# Patient Record
Sex: MALE | Race: WHITE | Employment: OTHER | URBAN - METROPOLITAN AREA
[De-identification: names, ages, dates, MRNs, and addresses within clinical notes are randomized per-mention and may not be internally consistent; named-entity substitution may affect disease eponyms.]

---

## 2017-06-20 ENCOUNTER — HOSPITAL ENCOUNTER (INPATIENT)
Age: 77
LOS: 1 days | Discharge: HOME | DRG: 310 | End: 2017-06-22
Attending: EMERGENCY MEDICINE | Admitting: INTERNAL MEDICINE
Payer: MEDICARE

## 2017-06-20 ENCOUNTER — APPOINTMENT (OUTPATIENT)
Dept: GENERAL RADIOLOGY | Age: 77
DRG: 310 | End: 2017-06-20
Attending: EMERGENCY MEDICINE
Payer: MEDICARE

## 2017-06-20 DIAGNOSIS — I48.91 NEW ONSET ATRIAL FIBRILLATION (HCC): Primary | ICD-10-CM

## 2017-06-20 DIAGNOSIS — I48.91 ATRIAL FIBRILLATION WITH RVR (HCC): ICD-10-CM

## 2017-06-20 PROBLEM — E03.9 ACQUIRED HYPOTHYROIDISM: Status: ACTIVE | Noted: 2017-06-20

## 2017-06-20 PROBLEM — C61 PROSTATE CANCER (HCC): Status: ACTIVE | Noted: 2017-06-20

## 2017-06-20 PROBLEM — I10 HTN (HYPERTENSION): Status: ACTIVE | Noted: 2017-06-20

## 2017-06-20 PROBLEM — E78.5 DYSLIPIDEMIA: Status: ACTIVE | Noted: 2017-06-20

## 2017-06-20 LAB
ALBUMIN SERPL BCP-MCNC: 3.3 G/DL (ref 3.2–4.6)
ALBUMIN/GLOB SERPL: 1.1 {RATIO} (ref 1.2–3.5)
ALP SERPL-CCNC: 81 U/L (ref 50–136)
ALT SERPL-CCNC: 24 U/L (ref 12–65)
ANION GAP BLD CALC-SCNC: 8 MMOL/L (ref 7–16)
AST SERPL W P-5'-P-CCNC: 27 U/L (ref 15–37)
ATRIAL RATE: 192 BPM
BASOPHILS # BLD AUTO: 0 K/UL (ref 0–0.2)
BASOPHILS # BLD: 1 % (ref 0–2)
BILIRUB SERPL-MCNC: 0.7 MG/DL (ref 0.2–1.1)
BUN SERPL-MCNC: 28 MG/DL (ref 8–23)
CALCIUM SERPL-MCNC: 8.2 MG/DL (ref 8.3–10.4)
CALCULATED R AXIS, ECG10: 57 DEGREES
CALCULATED T AXIS, ECG11: -62 DEGREES
CHLORIDE SERPL-SCNC: 112 MMOL/L (ref 98–107)
CO2 SERPL-SCNC: 25 MMOL/L (ref 21–32)
CREAT SERPL-MCNC: 2.04 MG/DL (ref 0.8–1.5)
DIAGNOSIS, 93000: NORMAL
DIFFERENTIAL METHOD BLD: NORMAL
EOSINOPHIL # BLD: 0.2 K/UL (ref 0–0.8)
EOSINOPHIL NFR BLD: 3 % (ref 0.5–7.8)
ERYTHROCYTE [DISTWIDTH] IN BLOOD BY AUTOMATED COUNT: 13.5 % (ref 11.9–14.6)
GLOBULIN SER CALC-MCNC: 3.1 G/DL (ref 2.3–3.5)
GLUCOSE SERPL-MCNC: 174 MG/DL (ref 65–100)
HCT VFR BLD AUTO: 42 % (ref 41.1–50.3)
HGB BLD-MCNC: 14.5 G/DL (ref 13.6–17.2)
IMM GRANULOCYTES # BLD: 0 K/UL (ref 0–0.5)
IMM GRANULOCYTES NFR BLD AUTO: 0.2 % (ref 0–5)
LYMPHOCYTES # BLD AUTO: 18 % (ref 13–44)
LYMPHOCYTES # BLD: 1.1 K/UL (ref 0.5–4.6)
MCH RBC QN AUTO: 29.8 PG (ref 26.1–32.9)
MCHC RBC AUTO-ENTMCNC: 34.5 G/DL (ref 31.4–35)
MCV RBC AUTO: 86.4 FL (ref 79.6–97.8)
MONOCYTES # BLD: 0.8 K/UL (ref 0.1–1.3)
MONOCYTES NFR BLD AUTO: 12 % (ref 4–12)
NEUTS SEG # BLD: 4.1 K/UL (ref 1.7–8.2)
NEUTS SEG NFR BLD AUTO: 66 % (ref 43–78)
PLATELET # BLD AUTO: 150 K/UL (ref 150–450)
PMV BLD AUTO: 10.8 FL (ref 10.8–14.1)
POTASSIUM SERPL-SCNC: 4.1 MMOL/L (ref 3.5–5.1)
PROT SERPL-MCNC: 6.4 G/DL (ref 6.3–8.2)
Q-T INTERVAL, ECG07: 304 MS
QRS DURATION, ECG06: 102 MS
QTC CALCULATION (BEZET), ECG08: 414 MS
RBC # BLD AUTO: 4.86 M/UL (ref 4.23–5.67)
SODIUM SERPL-SCNC: 145 MMOL/L (ref 136–145)
TROPONIN I BLD-MCNC: 0.01 NG/ML (ref 0–0.08)
TROPONIN I SERPL-MCNC: <0.02 NG/ML (ref 0.02–0.05)
TSH SERPL DL<=0.005 MIU/L-ACNC: 1.68 UIU/ML (ref 0.36–3.74)
VENTRICULAR RATE, ECG03: 112 BPM
WBC # BLD AUTO: 6.2 K/UL (ref 4.3–11.1)

## 2017-06-20 PROCEDURE — 74011000258 HC RX REV CODE- 258: Performed by: NURSE PRACTITIONER

## 2017-06-20 PROCEDURE — 74011000250 HC RX REV CODE- 250: Performed by: NURSE PRACTITIONER

## 2017-06-20 PROCEDURE — 85025 COMPLETE CBC W/AUTO DIFF WBC: CPT | Performed by: EMERGENCY MEDICINE

## 2017-06-20 PROCEDURE — 84443 ASSAY THYROID STIM HORMONE: CPT | Performed by: EMERGENCY MEDICINE

## 2017-06-20 PROCEDURE — 96374 THER/PROPH/DIAG INJ IV PUSH: CPT | Performed by: EMERGENCY MEDICINE

## 2017-06-20 PROCEDURE — 99285 EMERGENCY DEPT VISIT HI MDM: CPT | Performed by: EMERGENCY MEDICINE

## 2017-06-20 PROCEDURE — 74011000250 HC RX REV CODE- 250: Performed by: EMERGENCY MEDICINE

## 2017-06-20 PROCEDURE — 74011250636 HC RX REV CODE- 250/636: Performed by: NURSE PRACTITIONER

## 2017-06-20 PROCEDURE — 99218 HC RM OBSERVATION: CPT

## 2017-06-20 PROCEDURE — 36415 COLL VENOUS BLD VENIPUNCTURE: CPT | Performed by: INTERNAL MEDICINE

## 2017-06-20 PROCEDURE — 80053 COMPREHEN METABOLIC PANEL: CPT | Performed by: EMERGENCY MEDICINE

## 2017-06-20 PROCEDURE — 84484 ASSAY OF TROPONIN QUANT: CPT

## 2017-06-20 PROCEDURE — 74011250637 HC RX REV CODE- 250/637: Performed by: NURSE PRACTITIONER

## 2017-06-20 PROCEDURE — 93005 ELECTROCARDIOGRAM TRACING: CPT | Performed by: EMERGENCY MEDICINE

## 2017-06-20 PROCEDURE — 71010 XR CHEST SNGL V: CPT

## 2017-06-20 PROCEDURE — 96375 TX/PRO/DX INJ NEW DRUG ADDON: CPT | Performed by: EMERGENCY MEDICINE

## 2017-06-20 RX ORDER — HEPARIN SODIUM 5000 [USP'U]/100ML
12-25 INJECTION, SOLUTION INTRAVENOUS
Status: DISCONTINUED | OUTPATIENT
Start: 2017-06-20 | End: 2017-06-21

## 2017-06-20 RX ORDER — HEPARIN SODIUM 5000 [USP'U]/ML
4000 INJECTION, SOLUTION INTRAVENOUS; SUBCUTANEOUS ONCE
Status: COMPLETED | OUTPATIENT
Start: 2017-06-20 | End: 2017-06-20

## 2017-06-20 RX ORDER — SODIUM CHLORIDE 0.9 % (FLUSH) 0.9 %
5-10 SYRINGE (ML) INJECTION EVERY 8 HOURS
Status: DISCONTINUED | OUTPATIENT
Start: 2017-06-20 | End: 2017-06-22 | Stop reason: HOSPADM

## 2017-06-20 RX ORDER — TERAZOSIN 5 MG/1
10 CAPSULE ORAL
Status: DISCONTINUED | OUTPATIENT
Start: 2017-06-20 | End: 2017-06-22 | Stop reason: HOSPADM

## 2017-06-20 RX ORDER — SODIUM CHLORIDE 0.9 % (FLUSH) 0.9 %
5-10 SYRINGE (ML) INJECTION AS NEEDED
Status: DISCONTINUED | OUTPATIENT
Start: 2017-06-20 | End: 2017-06-22 | Stop reason: HOSPADM

## 2017-06-20 RX ORDER — DILTIAZEM HYDROCHLORIDE 5 MG/ML
25 INJECTION INTRAVENOUS
Status: COMPLETED | OUTPATIENT
Start: 2017-06-20 | End: 2017-06-20

## 2017-06-20 RX ORDER — SIMVASTATIN 20 MG/1
10 TABLET, FILM COATED ORAL
Status: DISCONTINUED | OUTPATIENT
Start: 2017-06-20 | End: 2017-06-21

## 2017-06-20 RX ORDER — MORPHINE SULFATE 2 MG/ML
2 INJECTION, SOLUTION INTRAMUSCULAR; INTRAVENOUS
Status: DISCONTINUED | OUTPATIENT
Start: 2017-06-20 | End: 2017-06-22 | Stop reason: HOSPADM

## 2017-06-20 RX ORDER — SIMVASTATIN 20 MG/1
20 TABLET, FILM COATED ORAL
COMMUNITY
End: 2017-06-22

## 2017-06-20 RX ORDER — TERAZOSIN 10 MG/1
10 CAPSULE ORAL
COMMUNITY
End: 2021-11-16

## 2017-06-20 RX ORDER — DILTIAZEM HYDROCHLORIDE 5 MG/ML
10 INJECTION INTRAVENOUS ONCE
Status: COMPLETED | OUTPATIENT
Start: 2017-06-20 | End: 2017-06-20

## 2017-06-20 RX ADMIN — TERAZOSIN HYDROCHLORIDE 10 MG: 5 CAPSULE ORAL at 21:31

## 2017-06-20 RX ADMIN — HEPARIN SODIUM 4000 UNITS: 5000 INJECTION, SOLUTION INTRAVENOUS; SUBCUTANEOUS at 19:29

## 2017-06-20 RX ADMIN — Medication 5 ML: at 21:32

## 2017-06-20 RX ADMIN — HEPARIN SODIUM AND DEXTROSE 12 UNITS/KG/HR: 5000; 5 INJECTION INTRAVENOUS at 19:30

## 2017-06-20 RX ADMIN — DILTIAZEM HYDROCHLORIDE 25 MG: 5 INJECTION INTRAVENOUS at 17:58

## 2017-06-20 RX ADMIN — SODIUM CHLORIDE 10 MG/HR: 900 INJECTION, SOLUTION INTRAVENOUS at 19:28

## 2017-06-20 RX ADMIN — DILTIAZEM HYDROCHLORIDE 10 MG: 5 INJECTION INTRAVENOUS at 19:28

## 2017-06-20 RX ADMIN — SIMVASTATIN 10 MG: 20 TABLET, FILM COATED ORAL at 21:31

## 2017-06-20 NOTE — ED NOTES
TRANSFER - OUT REPORT:    Verbal report given to NGUYỄN phelps on LAURA  being transferred to 328(unit) for routine progression of care       Report consisted of patients Situation, Background, Assessment and   Recommendations(SBAR). Information from the following report(s) SBAR, ED Summary, [facility] and Recent Results was reviewed with the receiving nurse. Lines:   Peripheral IV 06/20/17 Right Wrist (Active)   Site Assessment Clean, dry, & intact 6/20/2017  4:31 PM   Phlebitis Assessment 0 6/20/2017  4:31 PM   Infiltration Assessment 0 6/20/2017  4:31 PM   Dressing Status Clean, dry, & intact 6/20/2017  4:31 PM   Dressing Type Transparent 6/20/2017  4:31 PM   Hub Color/Line Status Pink;Flushed;Patent 6/20/2017  4:31 PM        Opportunity for questions and clarification was provided.       Patient transported with:   Monitor  Registered Nurse

## 2017-06-20 NOTE — ED PROVIDER NOTES
HPI Comments: Patient goes to the [address] [facility]. Was there to get his ears cleaned out. They checked his blood pressure and noted a fast heart rate with atrial fibrillation. In route with EMS 20 mg of Cardizem was given. Rate dropped below 100. It is slowly climbing here. Patient denies symptoms of chest pain shortness of breath weakness or lightheadedness. Patient is a 65 y.o. male presenting with palpitations. The history is provided by the patient. No  was used. Palpitations    This is a new problem. The current episode started 1 to 2 hours ago. The problem has not changed since onset. The problem occurs constantly. The problem is associated with nothing. Associated symptoms include irregular heartbeat. Pertinent negatives include no diaphoresis, no fever, no malaise/fatigue, no numbness, no chest pain, no chest pressure, no exertional chest pressure, no near-syncope, no orthopnea, no abdominal pain, no nausea, no vomiting, no headaches, no back pain, no leg pain, no lower extremity edema, no dizziness, no weakness, no cough and no shortness of breath. Risk factors include male gender. He has tried nothing for the symptoms. His past medical history does not include atrial fibrillation. Past Medical History:   Diagnosis Date    CAD (coronary artery disease)     high cholesterol    Cancer (HCC)     prostate    Endocrine disease     thyroid       Past Surgical History:   Procedure Laterality Date    HX PROSTATECTOMY           No family history on file. Social History     Social History    Marital status:      Spouse name: N/A    Number of children: N/A    Years of education: N/A     Occupational History    Not on file.      Social History Main Topics    Smoking status: Former Smoker    Smokeless tobacco: Not on file    Alcohol use No    Drug use: Not on file    Sexual activity: Not on file     Other Topics Concern    Not on file     Social History Narrative    No narrative on file         ALLERGIES: Review of patient's allergies indicates no known allergies. Review of Systems   Constitutional: Negative for chills, diaphoresis, fever and malaise/fatigue. HENT: Negative for rhinorrhea and sore throat. Eyes: Negative for pain and redness. Respiratory: Negative for cough, chest tightness, shortness of breath and wheezing. Cardiovascular: Positive for palpitations. Negative for chest pain, orthopnea, leg swelling and near-syncope. Gastrointestinal: Negative for abdominal pain, diarrhea, nausea and vomiting. Genitourinary: Negative for dysuria and hematuria. Musculoskeletal: Negative for back pain, gait problem, neck pain and neck stiffness. Skin: Negative for color change and rash. Neurological: Negative for dizziness, weakness, numbness and headaches. Vitals:    06/20/17 1600 06/20/17 1620 06/20/17 1638 06/20/17 1652   BP: (!) 139/91 162/78 162/78    Pulse: (!) 132 (!) 126 (!) 136 (!) 126   Resp: 20      Temp: 97.9 °F (36.6 °C)      SpO2: 96% 95% 96% 94%   Weight: 81.6 kg (180 lb)      Height: 5' 6.5\" (1.689 m)               Physical Exam   Constitutional: He is oriented to person, place, and time. He appears well-developed and well-nourished. HENT:   Head: Normocephalic and atraumatic. Neck: Normal range of motion. Neck supple. Cardiovascular: An irregularly irregular rhythm present. Tachycardia present. No murmur heard. Pulmonary/Chest: Effort normal and breath sounds normal. He has no wheezes. Abdominal: Soft. Bowel sounds are normal. There is no tenderness. Musculoskeletal: Normal range of motion. He exhibits no edema. Neurological: He is alert and oriented to person, place, and time. Skin: Skin is warm and dry. Nursing note and vitals reviewed. MDM  Number of Diagnoses or Management Options  Diagnosis management comments: Discussed with cardiology and will come and see.         Amount and/or Complexity of Data Reviewed  Clinical lab tests: ordered and reviewed  Tests in the radiology section of CPT®: reviewed and ordered  Tests in the medicine section of CPT®: ordered and reviewed    Patient Progress  Patient progress: stable    ED Course       Procedures      EKG: nonspecific ST and T waves changes, atrial fibrillation, rate 112. XR CHEST SNGL V (Final result) Result time: 06/20/17 16:40:16     Final result by Isabella Eduardo MD (06/20/17 16:40:16)     Impression:     IMPRESSION: No acute abnormality.     Narrative:     Portable chest x-ray    Clinical indications: Tachycardia    FINDINGS: Single AP view the chest submitted without comparison show the lungs  to be expanded and clear. No pleural effusion or pneumothorax. Old rib fractures  noted on the right. The cardiac silhouette and mediastinum are unremarkable.                Results Include:    Recent Results (from the past 24 hour(s))   EKG, 12 LEAD, INITIAL    Collection Time: 06/20/17  4:01 PM   Result Value Ref Range    Ventricular Rate 112 BPM    Atrial Rate 192 BPM    QRS Duration 102 ms    Q-T Interval 304 ms    QTC Calculation (Bezet) 414 ms    Calculated R Axis 57 degrees    Calculated T Axis -62 degrees    Diagnosis       !! AGE AND GENDER SPECIFIC ECG ANALYSIS !!   Atrial fibrillation with rapid ventricular response  Nonspecific ST and T wave abnormality , probably digitalis effect  Abnormal ECG  When compared with ECG of 05-JAN-2016 13:45,  Atrial fibrillation has replaced Sinus rhythm  Criteria for Anterior infarct are no longer Present  ST now depressed in Inferior leads  Confirmed by Evelina Coreas MD (), ALBERTO BRIDGES ([id number]) on 6/20/2017 4:56:18 PM     CBC WITH AUTOMATED DIFF    Collection Time: 06/20/17  4:15 PM   Result Value Ref Range    WBC 6.2 4.3 - 11.1 K/uL    RBC 4.86 4.23 - 5.67 M/uL    HGB 14.5 13.6 - 17.2 g/dL    HCT 42.0 41.1 - 50.3 %    MCV 86.4 79.6 - 97.8 FL    MCH 29.8 26.1 - 32.9 PG    MCHC 34.5 31.4 - 35.0 g/dL    RDW 13.5 11.9 - 14.6 % PLATELET 465 169 - 810 K/uL    MPV 10.8 10.8 - 14.1 FL    DF AUTOMATED      NEUTROPHILS 66 43 - 78 %    LYMPHOCYTES 18 13 - 44 %    MONOCYTES 12 4.0 - 12.0 %    EOSINOPHILS 3 0.5 - 7.8 %    BASOPHILS 1 0.0 - 2.0 %    IMMATURE GRANULOCYTES 0.2 0.0 - 5.0 %    ABS. NEUTROPHILS 4.1 1.7 - 8.2 K/UL    ABS. LYMPHOCYTES 1.1 0.5 - 4.6 K/UL    ABS. MONOCYTES 0.8 0.1 - 1.3 K/UL    ABS. EOSINOPHILS 0.2 0.0 - 0.8 K/UL    ABS. BASOPHILS 0.0 0.0 - 0.2 K/UL    ABS. IMM. GRANS. 0.0 0.0 - 0.5 K/UL   METABOLIC PANEL, COMPREHENSIVE    Collection Time: 06/20/17  4:15 PM   Result Value Ref Range    Sodium 145 136 - 145 mmol/L    Potassium 4.1 3.5 - 5.1 mmol/L    Chloride 112 (H) 98 - 107 mmol/L    CO2 25 21 - 32 mmol/L    Anion gap 8 7 - 16 mmol/L    Glucose 174 (H) 65 - 100 mg/dL    BUN 28 (H) 8 - 23 MG/DL    Creatinine 2.04 (H) 0.8 - 1.5 MG/DL    GFR est AA 41 (L) >60 ml/min/1.73m2    GFR est non-AA 34 (L) >60 ml/min/1.73m2    Calcium 8.2 (L) 8.3 - 10.4 MG/DL    Bilirubin, total 0.7 0.2 - 1.1 MG/DL    ALT (SGPT) 24 12 - 65 U/L    AST (SGOT) 27 15 - 37 U/L    Alk.  phosphatase 81 50 - 136 U/L    Protein, total 6.4 6.3 - 8.2 g/dL    Albumin 3.3 3.2 - 4.6 g/dL    Globulin 3.1 2.3 - 3.5 g/dL    A-G Ratio 1.1 (L) 1.2 - 3.5     POC TROPONIN-I    Collection Time: 06/20/17  4:17 PM   Result Value Ref Range    Troponin-I (POC) 0.01 0.0 - 0.08 ng/ml

## 2017-06-20 NOTE — ED TRIAGE NOTES
Patient went to [place] to have ears cleaned, but found to have heart rate in the 140s there. No history of afib. EMS called. Given 20 mg cardizem IVP which brought heart rate down to low 100s. Denies symptoms. Denies shortness of breath despite seeming short of breath to paramedics.

## 2017-06-20 NOTE — IP AVS SNAPSHOT
Current Discharge Medication List  
  
START taking these medications Dose & Instructions Dispensing Information Comments Morning Noon Evening Bedtime  
 amiodarone 200 mg tablet Commonly known as:  CORDARONE Your next dose is:  6/23 Dose:  200 mg Take 1 Tab by mouth two (2) times a day. Quantity:  60 Tab Refills:  1  
     
  
   
   
  
   
  
 finasteride 5 mg tablet Commonly known as:  PROSCAR Your next dose is:  6/23 Dose:  5 mg Take 1 Tab by mouth daily. Quantity:  30 Tab Refills:  6  
     
   
   
   
  
 metoprolol tartrate 50 mg tablet Commonly known as:  LOPRESSOR Your next dose is:  6/23 Dose:  50 mg Take 1 Tab by mouth two (2) times a day. Quantity:  60 Tab Refills:  6 CONTINUE these medications which have NOT CHANGED Dose & Instructions Dispensing Information Comments Morning Noon Evening Bedtime HYDROcodone-acetaminophen 5-325 mg per tablet Commonly known as:  Juan Daniel Grumbles Your next dose is:  As needed Dose:  1 Tab Take 1 Tab by mouth every four (4) hours as needed for Pain. Max Daily Amount: 6 Tabs. Quantity:  20 Tab Refills:  0  
     
   
   
   
  
 terazosin 10 mg capsule Commonly known as:  HYTRIN Your next dose is:  6/22 Dose:  10 mg Take 10 mg by mouth nightly. Refills:  0 STOP taking these medications   
 simvastatin 20 mg tablet Commonly known as:  ZOCOR Where to Get Your Medications Information on where to get these meds will be given to you by the nurse or doctor. ! Ask your nurse or doctor about these medications  
  amiodarone 200 mg tablet  
 finasteride 5 mg tablet  
 metoprolol tartrate 50 mg tablet

## 2017-06-20 NOTE — H&P
[address] Cardiology H&P    Admitting Cardiologist:Dr. Mal Dumas    Primary Cardiologist: none    Primary Care Physician:[facility]    Subjective:     Arcadio Chatman is a 65 y.o. male with darlene of HTN, CKD, hypothryoidism, no prior CAD, no prior CHF, denies bleeding or clotting disorders, who was at [address] [facility] primary care today getting ears cleaned out when he was noted to be tachycardic. ECG found him to be in atrial fibrillation with RVR. He was sent to ER for further evaluation. He was unaware of his arrhythmia. He has been given IV cardizem bolus in ER with improvement of rate. No Cp, sob, syncope or near syncope associated. Past Medical History:   Diagnosis Date         high cholesterol    Cancer [facility])     prostate    Endocrine disease     thyroid      Past Surgical History:   Procedure Laterality Date    HX PROSTATECTOMY        Current Facility-Administered Medications   Medication Dose Route Frequency    sodium chloride (NS) flush 5-10 mL  5-10 mL IntraVENous Q8H    sodium chloride (NS) flush 5-10 mL  5-10 mL IntraVENous PRN    dilTIAZem (CARDIZEM) injection 25 mg  25 mg IntraVENous NOW    dilTIAZem (CARDIZEM) 100 mg in 0.9% sodium chloride (MBP/ADV) 100 mL infusion  10 mg/hr IntraVENous TITRATE    dilTIAZem (CARDIZEM) injection 10 mg  10 mg IntraVENous ONCE    heparin (porcine) injection 4,000 Units  4,000 Units IntraVENous ONCE    heparin 25,000 units in dextrose 500 mL infusion  12-25 Units/kg/hr IntraVENous TITRATE     Current Outpatient Prescriptions   Medication Sig    HYDROcodone-acetaminophen (NORCO) 5-325 mg per tablet Take 1 Tab by mouth every four (4) hours as needed for Pain. Max Daily Amount: 6 Tabs. No Known Allergies   Social History   Substance Use Topics    Smoking status: Former Smoker    Smokeless tobacco: Not on file    Alcohol use No      No family history on file. Review of Systems  Gen: Denies fever, chills, malaise or fatigue. Appetite good.    HEENT: Denies frequent headaches, dizzyness, visual disturbances, Neck pain or swallowing difficulty  Lungs: Denies shortness of breath, hx of COPD, breathing problems  Cardiovascular: Denies chest pain, orthopnea, PND, no syncope or near syncope  GI: Denies hememesis, dark tarry stools, No prior Hx of GI bleed, Denies constipation  : Denies dysuria, no complaints of frequency, nocturia  Heme: No prior bleeding disorders, no prior Cancer  Neuro: Denies prior CVA, TIA. Endocrine: no diabetes, thyroid disorders  Psychiatric: Denies anxiety, or other psychiatric illnesses. Objective:     Visit Vitals    /78    Pulse (!) 126    Temp 97.9 °F (36.6 °C)    Resp 20    Ht 5' 6.5\" (1.689 m)    Wt 81.6 kg (180 lb)    SpO2 94%    BMI 28.62 kg/m2     General:Alert, cooperative, no distress, appears stated age  Head: Normocephalic, without obvious abnormality, atraumatic. Eyes: Conjunctivae/corneas clear. PERRL, EOMs intact  Nose:Nares normal. Septum midline. Mucosa normal. No drainage or sinus tenderness. Throat: Lips, mucosa, and tongue normal. Teeth and gums normal.   Neck: Supple, symmetrical, trachea midline,  no carotid bruit and no JVD. Lungs:Clear to auscultation bilaterally. Chest wall: No tenderness or deformity. Heart: tachycardic, irregular, irregular  Abdomen:Soft, non-tender. Bowel sounds normal. No masses, No organomegaly. Extremities: Extremities normal, atraumatic, no cyanosis or edema. Pulses: 2+ and symmetric all extremities. Skin: Skin color, texture, turgor normal. No rashes or lesions  Lymph nodes: Cervical, supraclavicular, and axillary nodes normal  Neurologic:No focal deficits identified                 ECG: atrial fibrillatiion with RVR.      Data Review:     Recent Results (from the past 24 hour(s))   EKG, 12 LEAD, INITIAL    Collection Time: 06/20/17  4:01 PM   Result Value Ref Range    Ventricular Rate 112 BPM    Atrial Rate 192 BPM    QRS Duration 102 ms    Q-T Interval 304 ms    QTC Calculation (Bezet) 414 ms    Calculated R Axis 57 degrees    Calculated T Axis -62 degrees    Diagnosis       !! AGE AND GENDER SPECIFIC ECG ANALYSIS !! Atrial fibrillation with rapid ventricular response  Nonspecific ST and T wave abnormality , probably digitalis effect  Abnormal ECG  When compared with ECG of 05-JAN-2016 13:45,  Atrial fibrillation has replaced Sinus rhythm  Criteria for Anterior infarct are no longer Present  ST now depressed in Inferior leads  Confirmed by JACKY HANLEY ()ALBERTO ([id number]) on 6/20/2017 4:56:18 PM     CBC WITH AUTOMATED DIFF    Collection Time: 06/20/17  4:15 PM   Result Value Ref Range    WBC 6.2 4.3 - 11.1 K/uL    RBC 4.86 4.23 - 5.67 M/uL    HGB 14.5 13.6 - 17.2 g/dL    HCT 42.0 41.1 - 50.3 %    MCV 86.4 79.6 - 97.8 FL    MCH 29.8 26.1 - 32.9 PG    MCHC 34.5 31.4 - 35.0 g/dL    RDW 13.5 11.9 - 14.6 %    PLATELET 108 894 - 681 K/uL    MPV 10.8 10.8 - 14.1 FL    DF AUTOMATED      NEUTROPHILS 66 43 - 78 %    LYMPHOCYTES 18 13 - 44 %    MONOCYTES 12 4.0 - 12.0 %    EOSINOPHILS 3 0.5 - 7.8 %    BASOPHILS 1 0.0 - 2.0 %    IMMATURE GRANULOCYTES 0.2 0.0 - 5.0 %    ABS. NEUTROPHILS 4.1 1.7 - 8.2 K/UL    ABS. LYMPHOCYTES 1.1 0.5 - 4.6 K/UL    ABS. MONOCYTES 0.8 0.1 - 1.3 K/UL    ABS. EOSINOPHILS 0.2 0.0 - 0.8 K/UL    ABS. BASOPHILS 0.0 0.0 - 0.2 K/UL    ABS. IMM. GRANS. 0.0 0.0 - 0.5 K/UL   METABOLIC PANEL, COMPREHENSIVE    Collection Time: 06/20/17  4:15 PM   Result Value Ref Range    Sodium 145 136 - 145 mmol/L    Potassium 4.1 3.5 - 5.1 mmol/L    Chloride 112 (H) 98 - 107 mmol/L    CO2 25 21 - 32 mmol/L    Anion gap 8 7 - 16 mmol/L    Glucose 174 (H) 65 - 100 mg/dL    BUN 28 (H) 8 - 23 MG/DL    Creatinine 2.04 (H) 0.8 - 1.5 MG/DL    GFR est AA 41 (L) >60 ml/min/1.73m2    GFR est non-AA 34 (L) >60 ml/min/1.73m2    Calcium 8.2 (L) 8.3 - 10.4 MG/DL    Bilirubin, total 0.7 0.2 - 1.1 MG/DL    ALT (SGPT) 24 12 - 65 U/L    AST (SGOT) 27 15 - 37 U/L    Alk.  phosphatase 81 50 - 136 U/L Protein, total 6.4 6.3 - 8.2 g/dL    Albumin 3.3 3.2 - 4.6 g/dL    Globulin 3.1 2.3 - 3.5 g/dL    A-G Ratio 1.1 (L) 1.2 - 3.5     TSH 3RD GENERATION    Collection Time: 06/20/17  4:15 PM   Result Value Ref Range    TSH 1.680 0.358 - 3.740 uIU/mL   POC TROPONIN-I    Collection Time: 06/20/17  4:17 PM   Result Value Ref Range    Troponin-I (POC) 0.01 0.0 - 0.08 ng/ml         Assessment / Plan     Principal Problem:    Atrial fibrillation with RVR (HCC) (6/20/2017)--admit to telemetry, rate control with IV cardizem, IV heparin for anticoagulation. Consideration for MONIQUE/ CV in am.     Active Problems:    HTN (hypertension) (6/20/2017)--elavated in ER, states he is on BP meds but none written on home med list. Add bb to Rx. Dyslipidemia (6/20/2017)--continue simvastatin       Acquired hypothyroidism (6/20/2017)--synthroid, tSH      Prostate cancer ([facility] [address] (6/20/2017)--continue home meds     CKD--uncertain of baseline but need to consider with OA Rx. Hyperglycemia--no prior hx of DM. Trend--increased CHADVASC score      Eze Prado NP     I have seen and examined the patient. Agree with above note. He has AF with RVR that is asymptomatic and of unknown duration. He has no cardiac history and denies chest pain. He has BAHENA at times. He has no prior CVA, takes Hytrin for BP. Exam shows NAD, rapid pulse, no JVD, clear lungs,   Rapid, irregular S1S2. EKG shows AF with RVR, no ST depression. Creatinine is elevated at 2.0. Recommend admission, rate control with IV Cardizem, IV heparin. Consider MONIQUE cardioversion in am if AF persists. Stress test later.      Donald Fuller MD

## 2017-06-20 NOTE — IP AVS SNAPSHOT
Dane Amato 
 
 
 [address] [address] 
[phone] Patient: Isaias Barros MRN: [id number] PZM:[id number] You are allergic to the following No active allergies Recent Documentation Height Weight BMI Smoking Status 1.689 m 84.8 kg 29.73 kg/m2 Former Smoker Emergency Contacts Name Discharge Info Relation Home Work Mobile Oneal Castillo [24] [phone] About your hospitalization You were admitted on:  June 20, 2017 You last received care in the:  [facility] 3 CLINICAL OBSERVATION You were discharged on:  June 22, 2017 Unit phone number:  [phone] Why you were hospitalized Your primary diagnosis was:  Atrial Fibrillation With Rvr (Hcc) Your diagnoses also included:  Htn (Hypertension), Dyslipidemia, Acquired Hypothyroidism, Prostate Cancer (Hcc) Providers Seen During Your Hospitalizations Provider Role Specialty Primary office phone Kodyjim Foreman MD Attending Provider Emergency Medicine [phone] Dana Kwan MD Attending Provider Cardiology [phone] Your Primary Care Physician (PCP) Primary Care Physician Office Phone Office Fax Yoel Allen [phone] Follow-up Information Follow up With Details Comments Contact Info 4303 Abhishek Hooper, physical therapy [address] [place] [address] 
[phone] Carmen Salinas MD   [address] [place] [id number] 
[phone] Current Discharge Medication List  
  
START taking these medications Dose & Instructions Dispensing Information Comments Morning Noon Evening Bedtime  
 amiodarone 200 mg tablet Commonly known as:  CORDARONE Your next dose is:  6/23 Dose:  200 mg Take 1 Tab by mouth two (2) times a day. Quantity:  60 Tab Refills:  1  
     
  
   
   
  
   
  
 finasteride 5 mg tablet Commonly known as:  PROSCAR Your next dose is:  6/23 Dose:  5 mg Take 1 Tab by mouth daily. Quantity:  30 Tab Refills:  6  
     
   
   
   
  
 metoprolol tartrate 50 mg tablet Commonly known as:  LOPRESSOR Your next dose is:  6/23 Dose:  50 mg Take 1 Tab by mouth two (2) times a day. Quantity:  60 Tab Refills:  6 CONTINUE these medications which have NOT CHANGED Dose & Instructions Dispensing Information Comments Morning Noon Evening Bedtime HYDROcodone-acetaminophen 5-325 mg per tablet Commonly known as:  Juan Daniel Rios Your next dose is:  As needed Dose:  1 Tab Take 1 Tab by mouth every four (4) hours as needed for Pain. Max Daily Amount: 6 Tabs. Quantity:  20 Tab Refills:  0  
     
   
   
   
  
 terazosin 10 mg capsule Commonly known as:  HYTRIN Your next dose is:  6/22 Dose:  10 mg Take 10 mg by mouth nightly. Refills:  0 STOP taking these medications   
 simvastatin 20 mg tablet Commonly known as:  ZOCOR Where to Get Your Medications Information on where to get these meds will be given to you by the nurse or doctor. ! Ask your nurse or doctor about these medications  
  amiodarone 200 mg tablet  
 finasteride 5 mg tablet  
 metoprolol tartrate 50 mg tablet Discharge Instructions Atrial Fibrillation: Care Instructions Your Care Instructions Atrial fibrillation is an irregular and often fast heartbeat. Treating this condition is important for several reasons. It can cause blood clots, which can travel from your heart to your brain and cause a stroke. If you have a fast heartbeat, you may feel lightheaded, dizzy, and weak. An irregular heartbeat can also increase your risk for heart failure. Atrial fibrillation is often the result of another heart condition, such as high blood pressure or coronary artery disease. Making changes to improve your heart condition will help you stay healthy and active. Follow-up care is a key part of your treatment and safety. Be sure to make and go to all appointments, and call your doctor if you are having problems. It's also a good idea to know your test results and keep a list of the medicines you take. How can you care for yourself at home? Medicines · Take your medicines exactly as prescribed. Call your doctor if you think you are having a problem with your medicine. You will get more details on the specific medicines your doctor prescribes. · If your doctor has given you a blood thinner to prevent a stroke, be sure you get instructions about how to take your medicine safely. Blood thinners can cause serious bleeding problems. · Do not take any vitamins, over-the-counter drugs, or herbal products without talking to your doctor first. 
Lifestyle changes · Do not smoke. Smoking can increase your chance of a stroke and heart attack. If you need help quitting, talk to your doctor about stop-smoking programs and medicines. These can increase your chances of quitting for good. · Eat a heart-healthy diet. · Stay at a healthy weight. Lose weight if you need to. · Limit alcohol to 2 drinks a day for men and 1 drink a day for women. Too much alcohol can cause health problems. · Avoid colds and flu. Get a pneumococcal vaccine shot. If you have had one before, ask your doctor whether you need another dose. Get a flu shot every year. If you must be around people with colds or flu, wash your hands often. Activity · If your doctor recommends it, get more exercise. Walking is a good choice. Bit by bit, increase the amount you walk every day. Try for at least 30 minutes on most days of the week.  You also may want to swim, bike, or do other activities. Your doctor may suggest that you join a cardiac rehabilitation program so that you can have help increasing your physical activity safely. · Start light exercise if your doctor says it is okay. Even a small amount will help you get stronger, have more energy, and manage stress. Walking is an easy way to get exercise. Start out by walking a little more than you did in the hospital. Gradually increase the amount you walk. · When you exercise, watch for signs that your heart is working too hard. You are pushing too hard if you cannot talk while you are exercising. If you become short of breath or dizzy or have chest pain, sit down and rest immediately. · Check your pulse regularly. Place two fingers on the artery at the palm side of your wrist, in line with your thumb. If your heartbeat seems uneven or fast, talk to your doctor. When should you call for help? Call 911 anytime you think you may need emergency care. For example, call if: 
· You have symptoms of a heart attack. These may include: ¨ Chest pain or pressure, or a strange feeling in the chest. 
¨ Sweating. ¨ Shortness of breath. ¨ Nausea or vomiting. ¨ Pain, pressure, or a strange feeling in the back, neck, jaw, or upper belly or in one or both shoulders or arms. ¨ Lightheadedness or sudden weakness. ¨ A fast or irregular heartbeat. After you call 911, the  may tell you to chew 1 adult-strength or 2 to 4 low-dose aspirin. Wait for an ambulance. Do not try to drive yourself. · You have symptoms of a stroke. These may include: 
¨ Sudden numbness, tingling, weakness, or loss of movement in your face, arm, or leg, especially on only one side of your body. ¨ Sudden vision changes. ¨ Sudden trouble speaking. ¨ Sudden confusion or trouble understanding simple statements. ¨ Sudden problems with walking or balance. ¨ A sudden, severe headache that is different from past headaches. · You passed out (lost consciousness). Call your doctor now or seek immediate medical care if: 
· You have new or increased shortness of breath. · You feel dizzy or lightheaded, or you feel like you may faint. · Your heart rate becomes irregular. · You can feel your heart flutter in your chest or skip heartbeats. Tell your doctor if these symptoms are new or worse. Watch closely for changes in your health, and be sure to contact your doctor if you have any problems. Where can you learn more? Go to http://marcin-matt.info/. Enter U020 in the search box to learn more about \"Atrial Fibrillation: Care Instructions. \" Current as of: September 21, 2016 Content Version: 11.3 © 5467-5723 [employer]. Care instructions adapted under license by [employer] (which disclaims liability or warranty for this information). If you have questions about a medical condition or this instruction, always ask your healthcare professional. [address] [address] any warranty or liability for your use of this information. Learning About Cardioversion What is cardioversion? Cardioversion helps your heart return to a normal rhythm. It treats problems like atrial fibrillation. It is also sometimes used in emergencies. It can correct a fast heartbeat that causes low blood pressure, chest pain, or heart failure. There are two types: · The electrical type uses an electric current. The current enters your body through patches on your chest or back. · The chemical type uses medicines. The medicine is usually put into your arm through a tube called an IV. How is cardioversion done? Your doctor may ask you to take medicines before the treatment. These help prevent blood clots. Your doctor will watch you closely to make sure that there are no problems. Electrical cardioversion The electrical procedure is done in a hospital. You will get medicine to help you relax and control the pain. Your doctor will put patches on your chest or back. The patches send an electric current to your heart. This resets your heart rhythm. The electrical part takes about 5 minutes. But you will probably be in the hospital for 1 to 2 hours. You will need to recover from the effects of the sedative medicine. Chemical cardioversion The chemical procedure is most often done in a hospital. In most cases, the medicine is put into your arm through a tube called an IV. But you may get medicines to take by mouth. You may feel a quick sting or pinch when the IV starts. The procedure usually takes about 4 to 8 hours. What can you expect after cardioversion? · You can usually go home the same day. You will need someone to drive you home. · Your doctor may have you take medicines daily. These help your heart beat normally and prevent blood clots. · After electrical cardioversion, you may have redness where the patches were. This looks and feels like a sunburn. · Abnormal heart rhythms sometimes come back after cardioversion. Follow-up care is a key part of your treatment and safety. Be sure to make and go to all appointments, and call your doctor if you are having problems. It's also a good idea to know your test results and keep a list of the medicines you take. Where can you learn more? Go to http://marcin-matt.info/. Enter S174 in the search box to learn more about \"Learning About Cardioversion. \" Current as of: April 3, 2017 Content Version: 11.3 © 6271-5243 [employer]. Care instructions adapted under license by [employer] (which disclaims liability or warranty for this information). If you have questions about a medical condition or this instruction, always ask your healthcare professional. [address] [address] any warranty or liability for your use of this information. Discharge Orders None [employer] Announcement We are excited to announce that we are making your provider's discharge notes available to you in [employer]. You will see these notes when they are completed and signed by the physician that discharged you from your recent hospital stay. If you have any questions or concerns about any information you see in [employer], please call the Health Information Department where you were seen or reach out to your Primary Care Provider for more information about your plan of care. Introducing [facility]! Sandi Dias introduces [employer] patient portal. Now you can access parts of your medical record, email your doctor's office, and request medication refills online. 1. In your internet browser, go to [email or web address] 2. Click on the First Time User? Click Here link in the Sign In box. You will see the New Member Sign Up page. 3. Enter your [employer] Access Code exactly as it appears below. You will not need to use this code after youve completed the sign-up process. If you do not sign up before the expiration date, you must request a new code. · [employer] Access Code: [email or web address] Expires: 9/19/2017  8:21 AM 
 
4. Enter the last four digits of your Social Security Number (xxxx) and Date of Birth (mm/dd/yyyy) as indicated and click Submit. You will be taken to the next sign-up page. 5. Create a [employer] ID. This will be your [employer] login ID and cannot be changed, so think of one that is secure and easy to remember. 6. Create a [employer] password. You can change your password at any time. 7. Enter your Password Reset Question and Answer. This can be used at a later time if you forget your password. 8. Enter your e-mail address. You will receive e-mail notification when new information is available in [address] 9. Click Sign Up. You can now view and download portions of your medical record. 10. Click the Download Summary menu link to download a portable copy of your medical information. If you have questions, please visit the Frequently Asked Questions section of the [employer] website. Remember, MyChart is NOT to be used for urgent needs. For medical emergencies, dial 911. Now available from your iPhone and Android! General Information Please provide this summary of care documentation to your next provider. Patient Signature:  ____________________________________________________________ Date:  ____________________________________________________________  
  
[facility] Signature:  ____________________________________________________________ Date:  ____________________________________________________________

## 2017-06-21 ENCOUNTER — APPOINTMENT (OUTPATIENT)
Dept: ULTRASOUND IMAGING | Age: 77
DRG: 310 | End: 2017-06-21
Attending: INTERNAL MEDICINE
Payer: MEDICARE

## 2017-06-21 LAB
ANION GAP BLD CALC-SCNC: 14 MMOL/L (ref 7–16)
APTT PPP: 52.8 SEC (ref 23.5–31.7)
APTT PPP: 77.7 SEC (ref 23.5–31.7)
BUN SERPL-MCNC: 25 MG/DL (ref 8–23)
CALCIUM SERPL-MCNC: 8.5 MG/DL (ref 8.3–10.4)
CHLORIDE SERPL-SCNC: 112 MMOL/L (ref 98–107)
CHOLEST SERPL-MCNC: 85 MG/DL
CO2 SERPL-SCNC: 21 MMOL/L (ref 21–32)
CREAT SERPL-MCNC: 1.88 MG/DL (ref 0.8–1.5)
GLUCOSE SERPL-MCNC: 120 MG/DL (ref 65–100)
HDLC SERPL-MCNC: 35 MG/DL (ref 40–60)
HDLC SERPL: 2.4 {RATIO}
LDLC SERPL CALC-MCNC: 37.6 MG/DL
LIPID PROFILE,FLP: ABNORMAL
POTASSIUM SERPL-SCNC: 3.7 MMOL/L (ref 3.5–5.1)
SODIUM SERPL-SCNC: 147 MMOL/L (ref 136–145)
TRIGL SERPL-MCNC: 62 MG/DL (ref 35–150)
TROPONIN I SERPL-MCNC: <0.02 NG/ML (ref 0.02–0.05)
VLDLC SERPL CALC-MCNC: 12.4 MG/DL (ref 6–23)

## 2017-06-21 PROCEDURE — 74011000250 HC RX REV CODE- 250: Performed by: NURSE PRACTITIONER

## 2017-06-21 PROCEDURE — 84484 ASSAY OF TROPONIN QUANT: CPT | Performed by: INTERNAL MEDICINE

## 2017-06-21 PROCEDURE — 85730 THROMBOPLASTIN TIME PARTIAL: CPT | Performed by: INTERNAL MEDICINE

## 2017-06-21 PROCEDURE — 92960 CARDIOVERSION ELECTRIC EXT: CPT

## 2017-06-21 PROCEDURE — 36415 COLL VENOUS BLD VENIPUNCTURE: CPT | Performed by: INTERNAL MEDICINE

## 2017-06-21 PROCEDURE — 74011250637 HC RX REV CODE- 250/637: Performed by: NURSE PRACTITIONER

## 2017-06-21 PROCEDURE — 65660000000 HC RM CCU STEPDOWN

## 2017-06-21 PROCEDURE — 99218 HC RM OBSERVATION: CPT

## 2017-06-21 PROCEDURE — 74011250636 HC RX REV CODE- 250/636

## 2017-06-21 PROCEDURE — 76770 US EXAM ABDO BACK WALL COMP: CPT

## 2017-06-21 PROCEDURE — 5A2204Z RESTORATION OF CARDIAC RHYTHM, SINGLE: ICD-10-PCS | Performed by: INTERNAL MEDICINE

## 2017-06-21 PROCEDURE — 74011250637 HC RX REV CODE- 250/637: Performed by: INTERNAL MEDICINE

## 2017-06-21 PROCEDURE — 80048 BASIC METABOLIC PNL TOTAL CA: CPT | Performed by: INTERNAL MEDICINE

## 2017-06-21 PROCEDURE — 74011250636 HC RX REV CODE- 250/636: Performed by: INTERNAL MEDICINE

## 2017-06-21 PROCEDURE — B24BZZ4 ULTRASONOGRAPHY OF HEART WITH AORTA, TRANSESOPHAGEAL: ICD-10-PCS | Performed by: INTERNAL MEDICINE

## 2017-06-21 PROCEDURE — 80061 LIPID PANEL: CPT | Performed by: INTERNAL MEDICINE

## 2017-06-21 PROCEDURE — 74011000258 HC RX REV CODE- 258: Performed by: NURSE PRACTITIONER

## 2017-06-21 RX ORDER — AMIODARONE HYDROCHLORIDE 200 MG/1
400 TABLET ORAL 2 TIMES DAILY
Status: DISCONTINUED | OUTPATIENT
Start: 2017-06-21 | End: 2017-06-22

## 2017-06-21 RX ORDER — HEPARIN SODIUM 5000 [USP'U]/ML
35 INJECTION, SOLUTION INTRAVENOUS; SUBCUTANEOUS ONCE
Status: COMPLETED | OUTPATIENT
Start: 2017-06-21 | End: 2017-06-21

## 2017-06-21 RX ORDER — MIDAZOLAM HYDROCHLORIDE 1 MG/ML
.5-5 INJECTION, SOLUTION INTRAMUSCULAR; INTRAVENOUS
Status: DISCONTINUED | OUTPATIENT
Start: 2017-06-21 | End: 2017-06-21

## 2017-06-21 RX ORDER — FINASTERIDE 5 MG/1
5 TABLET, FILM COATED ORAL DAILY
Status: DISCONTINUED | OUTPATIENT
Start: 2017-06-22 | End: 2017-06-22 | Stop reason: HOSPADM

## 2017-06-21 RX ADMIN — Medication 10 ML: at 21:22

## 2017-06-21 RX ADMIN — SODIUM CHLORIDE 10 MG/HR: 900 INJECTION, SOLUTION INTRAVENOUS at 09:09

## 2017-06-21 RX ADMIN — MIDAZOLAM HYDROCHLORIDE 2 MG: 1 INJECTION, SOLUTION INTRAMUSCULAR; INTRAVENOUS at 11:45

## 2017-06-21 RX ADMIN — HEPARIN SODIUM 3000 UNITS: 5000 INJECTION, SOLUTION INTRAVENOUS; SUBCUTANEOUS at 04:15

## 2017-06-21 RX ADMIN — MIDAZOLAM HYDROCHLORIDE 2 MG: 1 INJECTION, SOLUTION INTRAMUSCULAR; INTRAVENOUS at 11:50

## 2017-06-21 RX ADMIN — TERAZOSIN HYDROCHLORIDE 10 MG: 5 CAPSULE ORAL at 21:22

## 2017-06-21 RX ADMIN — AMIODARONE HYDROCHLORIDE 400 MG: 200 TABLET ORAL at 17:10

## 2017-06-21 RX ADMIN — SODIUM CHLORIDE 10 MG/HR: 900 INJECTION, SOLUTION INTRAVENOUS at 03:00

## 2017-06-21 NOTE — PROGRESS NOTES
TRANSFER - OUT REPORT:    Verbal report given to NGUYỄN Chavez(name) on Sirisha Brannon  being transferred to [address] [place] [address](unit) for routine progression of care       Report consisted of patients Situation, Background, Assessment and   Recommendations(SBAR). Information from the following report(s) SBAR was reviewed with the receiving nurse.    has No Known Allergies. Opportunity for questions and clarification was provided. Procedure Summary:Pt had MONIQUE and was cardioverted with 200 joules x1 to nsr. Pt libia well, vitals stable.   Med Administration    Versed:  4 mg    Visit Vitals    /57    Pulse (!) 117    Temp 97.9 °F (36.6 °C)    Resp 18    Ht 5' 6.5\" (1.689 m)    Wt 84.6 kg (186 lb 9.6 oz)    SpO2 96%    BMI 29.67 kg/m2     Past Medical History:   Diagnosis Date    CAD (coronary artery disease)     high cholesterol    Cancer (HCC)     prostate    Endocrine disease     thyroid           Peripheral IV 06/20/17 Right Wrist (Active)   Site Assessment Clean, dry, & intact 6/21/2017  7:30 AM   Phlebitis Assessment 0 6/21/2017  7:30 AM   Infiltration Assessment 0 6/21/2017  7:30 AM   Dressing Status Clean, dry, & intact 6/21/2017  7:30 AM   Dressing Type Tape;Transparent 6/21/2017  7:30 AM   Hub Color/Line Status Patent 6/21/2017  7:30 AM       Peripheral IV 06/20/17 Left Antecubital (Active)   Site Assessment Clean, dry, & intact 6/21/2017  7:30 AM   Phlebitis Assessment 0 6/21/2017  7:30 AM   Infiltration Assessment 0 6/21/2017  7:30 AM   Dressing Status Clean, dry, & intact 6/21/2017  7:30 AM   Dressing Type Tape;Transparent 6/21/2017  7:30 AM   Hub Color/Line Status Patent 6/21/2017  7:30 AM

## 2017-06-21 NOTE — PROGRESS NOTES
Care Management Interventions  PCP Verified by CM: Yes  Current Support Network: Own Home, Family Lives Nearby  Confirm Follow Up Transport: Family  Plan discussed with Pt/Family/Caregiver: Yes  Freedom of Choice Offered: Yes  Discharge Location  Discharge Placement: Home with family assistance    SW met with patient this date to initiate D/C planning. Pt is now inpatient. Pt alert and oriented in all spheres. Pt lives alone, has a brother that lives close and is supportive: Fariba Dane [phone]. Pt is normally independent with all ADL's, uses a cane at time. Pt is a regular patient at the [facility] outpatient clinic. Pt drives. Pt declines HH at this time- pt agreeable to alert SW if he changes his mind. No needs identified for discharge at this time. Plan: Home w support from brother.

## 2017-06-21 NOTE — PROGRESS NOTES
TRANSFER - OUT REPORT:    Verbal report given to NGUYỄN Chavez(name) on [facility]  being transferred to [facility](unit) for routine post - op       Report consisted of patients Situation, Background, Assessment and   Recommendations(SBAR). Information from the following report(s) SBAR, Procedure Summary, Intake/Output, MAR and Cardiac Rhythm NSR was reviewed with the receiving nurse. Lines:   Peripheral IV 06/20/17 Right Wrist (Active)   Site Assessment Clean, dry, & intact; Clean;Dry 6/21/2017 12:31 PM   Phlebitis Assessment 0 6/21/2017 12:31 PM   Infiltration Assessment 0 6/21/2017 12:31 PM   Dressing Status Clean, dry, & intact; Clean;Dry 6/21/2017 12:31 PM   Dressing Type Tape;Transparent 6/21/2017 12:31 PM   Hub Color/Line Status Patent; Infusing;Pink 6/21/2017 12:31 PM       Peripheral IV 06/20/17 Left Antecubital (Active)   Site Assessment Clean, dry, & intact; Clean;Dry 6/21/2017 12:31 PM   Phlebitis Assessment 0 6/21/2017 12:31 PM   Infiltration Assessment 0 6/21/2017 12:31 PM   Dressing Status Clean, dry, & intact; Clean;Dry 6/21/2017 12:31 PM   Dressing Type Tape;Transparent 6/21/2017 12:31 PM   Hub Color/Line Status Patent;Capped;Pink 6/21/2017 12:31 PM        Opportunity for questions and clarification was provided.

## 2017-06-21 NOTE — PROGRESS NOTES
[facility] CARDIOLOGY PROGRESS NOTE           6/21/2017 8:14 AM    Admit Date: 6/20/2017      Subjective:   No cp or sob. ROS:  Cardiovascular:  As noted above    Objective:      Vitals:    06/20/17 2011 06/21/17 0027 06/21/17 0048 06/21/17 0538   BP: (!) 176/91 (!) 147/97  145/80   Pulse: 95 (!) 126 (!) 115 (!) 125   Resp: 20 20 20   Temp: 98.1 °F (36.7 °C) 98.3 °F (36.8 °C)  98.4 °F (36.9 °C)   SpO2: 97% 95%  95%   Weight: 85.7 kg (189 lb)   84.6 kg (186 lb 9.6 oz)   Height: 5' 6.5\" (1.689 m)          Physical Exam:  General-No Acute Distress  Neck- supple, no JVD  CV- irregular rate and rhythm no MRG  Lung- clear bilaterally  Abd- soft, nontender, nondistended  Ext- no edema bilaterally. Skin- warm and dry    Data Review:   Recent Labs      06/21/17   0220  06/20/17   2155  06/20/17   1615   NA  147*   --   145   K  3.7   --   4.1   BUN  25*   --   28*   CREA  1.88*   --   2.04*   GLU  120*   --   174*   WBC   --    --   6.2   HGB   --    --   14.5   HCT   --    --   42.0   PLT   --    --   150   TROIQ  <0.02*  <0.02*   --    CHOL  85   --    --    LDLC  37.6   --    --    HDL  35*   --    --        Assessment/Plan:     Principal Problem:    Atrial fibrillation with RVR (HCC) (6/20/2017)    Active Problems:    HTN (hypertension) (6/20/2017)      Dyslipidemia (6/20/2017)      Acquired hypothyroidism (6/20/2017)      Prostate cancer ([facility] [address] (6/20/2017)      ///    He's having hematuria on heparin. Will go ahead and do a teeeCv today. Will ask urology to see. Will check an abd uls to r/o obstruction and look at kidney size re: ckd.     Armando Gannon MD  6/21/2017 8:14 AM

## 2017-06-21 NOTE — PROCEDURES
Brief Cardiac Procedure Note    Patient: Rhea Rasmussen MRN: [id number]  SSN: [id number]    YOB: 1940  Age: 65 y.o. Sex: male      Date of Procedure: 6/21/2017     Pre-procedure Diagnosis: Atrial Fibrillation/Atrial Flutter    Post-procedure Diagnosis: same    Procedure: tania and Cardioversion    Brief Description of Procedure:     Performed By: Betty Damon MD     Assistants:     Anesthesia: Moderate Sedation    Estimated Blood Loss: Less than 10 mL      Specimens: None    Implants: None    Findings:   No clot  Afib>> nsr 813 joules    Complications: None    Recommendations: Continue medical therapy.     Signed By: Betty Damon MD     June 21, 2017

## 2017-06-21 NOTE — PROGRESS NOTES
Bedside report given to Natalie Little RN in cpru for continued care. Pt's brother updated at bedside.

## 2017-06-21 NOTE — PROGRESS NOTES
Problem: Afib Pathway: Day 1  Goal: Activity/Safety  Outcome: Resolved/Met Date Met:  06/21/17  Up with assistance  Goal: Consults, if ordered  Outcome: Resolved/Met Date Met:  06/21/17  Cardiology following. Goal: Nutrition/Diet  Outcome: Resolved/Met Date Met:  06/21/17  Tolerated diet well  Goal: Discharge Planning  Outcome: Progressing Towards Goal  Family support available  Goal: Respiratory  Outcome: Resolved/Met Date Met:  06/21/17  RA  Goal: Psychosocial  Outcome: Resolved/Met Date Met:  06/21/17  Family support  Goal: *Optimal pain control at patients stated goal  Outcome: Resolved/Met Date Met:  06/21/17  No pain  Goal: *Stable cardiac rhythm  Outcome: Progressing Towards Goal  A. Fib

## 2017-06-21 NOTE — PROGRESS NOTES
TRANSFER - IN REPORT:    Verbal report received from [facility]) on [facility]  being received from ED(unit) for routine progression of care      Report consisted of patients Situation, Background, Assessment and   Recommendations(SBAR). Information from the following report(s) SBAR, Kardex, ED Summary, MAR, Accordion, Recent Results and Cardiac Rhythm A. Fib RVR was reviewed with the receiving nurse. Opportunity for questions and clarification was provided. Assessment completed upon patients arrival to unit and care assumed.

## 2017-06-22 ENCOUNTER — HOME HEALTH ADMISSION (OUTPATIENT)
Dept: HOME HEALTH SERVICES | Facility: HOME HEALTH | Age: 77
End: 2017-06-22

## 2017-06-22 VITALS
WEIGHT: 187 LBS | HEART RATE: 88 BPM | BODY MASS INDEX: 29.35 KG/M2 | HEIGHT: 67 IN | OXYGEN SATURATION: 93 % | RESPIRATION RATE: 22 BRPM | DIASTOLIC BLOOD PRESSURE: 99 MMHG | TEMPERATURE: 97.7 F | SYSTOLIC BLOOD PRESSURE: 167 MMHG

## 2017-06-22 LAB
ANION GAP BLD CALC-SCNC: 7 MMOL/L (ref 7–16)
BUN SERPL-MCNC: 20 MG/DL (ref 8–23)
CALCIUM SERPL-MCNC: 8.4 MG/DL (ref 8.3–10.4)
CHLORIDE SERPL-SCNC: 112 MMOL/L (ref 98–107)
CO2 SERPL-SCNC: 26 MMOL/L (ref 21–32)
CREAT SERPL-MCNC: 1.65 MG/DL (ref 0.8–1.5)
ERYTHROCYTE [DISTWIDTH] IN BLOOD BY AUTOMATED COUNT: 13.7 % (ref 11.9–14.6)
GLUCOSE SERPL-MCNC: 118 MG/DL (ref 65–100)
HCT VFR BLD AUTO: 41.2 % (ref 41.1–50.3)
HGB BLD-MCNC: 14.4 G/DL (ref 13.6–17.2)
MAGNESIUM SERPL-MCNC: 2.1 MG/DL (ref 1.8–2.4)
MCH RBC QN AUTO: 29.9 PG (ref 26.1–32.9)
MCHC RBC AUTO-ENTMCNC: 35 G/DL (ref 31.4–35)
MCV RBC AUTO: 85.7 FL (ref 79.6–97.8)
PLATELET # BLD AUTO: 160 K/UL (ref 150–450)
PMV BLD AUTO: 10.5 FL (ref 10.8–14.1)
POTASSIUM SERPL-SCNC: 4.4 MMOL/L (ref 3.5–5.1)
RBC # BLD AUTO: 4.81 M/UL (ref 4.23–5.67)
SODIUM SERPL-SCNC: 145 MMOL/L (ref 136–145)
WBC # BLD AUTO: 6.5 K/UL (ref 4.3–11.1)

## 2017-06-22 PROCEDURE — 85027 COMPLETE CBC AUTOMATED: CPT | Performed by: INTERNAL MEDICINE

## 2017-06-22 PROCEDURE — 97162 PT EVAL MOD COMPLEX 30 MIN: CPT

## 2017-06-22 PROCEDURE — 94760 N-INVAS EAR/PLS OXIMETRY 1: CPT

## 2017-06-22 PROCEDURE — 83735 ASSAY OF MAGNESIUM: CPT | Performed by: INTERNAL MEDICINE

## 2017-06-22 PROCEDURE — 99152 MOD SED SAME PHYS/QHP 5/>YRS: CPT

## 2017-06-22 PROCEDURE — 93312 ECHO TRANSESOPHAGEAL: CPT

## 2017-06-22 PROCEDURE — 77010033678 HC OXYGEN DAILY

## 2017-06-22 PROCEDURE — 74011250637 HC RX REV CODE- 250/637: Performed by: UROLOGY

## 2017-06-22 PROCEDURE — 74011250637 HC RX REV CODE- 250/637: Performed by: INTERNAL MEDICINE

## 2017-06-22 PROCEDURE — 80048 BASIC METABOLIC PNL TOTAL CA: CPT | Performed by: INTERNAL MEDICINE

## 2017-06-22 PROCEDURE — 36415 COLL VENOUS BLD VENIPUNCTURE: CPT | Performed by: INTERNAL MEDICINE

## 2017-06-22 RX ORDER — METOPROLOL TARTRATE 50 MG/1
50 TABLET ORAL 2 TIMES DAILY
Status: DISCONTINUED | OUTPATIENT
Start: 2017-06-22 | End: 2017-06-22 | Stop reason: HOSPADM

## 2017-06-22 RX ORDER — FINASTERIDE 5 MG/1
5 TABLET, FILM COATED ORAL DAILY
Qty: 30 TAB | Refills: 6 | Status: SHIPPED | OUTPATIENT
Start: 2017-06-22

## 2017-06-22 RX ORDER — AMIODARONE HYDROCHLORIDE 200 MG/1
200 TABLET ORAL 2 TIMES DAILY
Status: DISCONTINUED | OUTPATIENT
Start: 2017-06-22 | End: 2017-06-22 | Stop reason: HOSPADM

## 2017-06-22 RX ORDER — AMIODARONE HYDROCHLORIDE 200 MG/1
200 TABLET ORAL 2 TIMES DAILY
Qty: 60 TAB | Refills: 1 | Status: SHIPPED | OUTPATIENT
Start: 2017-06-22 | End: 2020-02-13

## 2017-06-22 RX ORDER — METOPROLOL TARTRATE 50 MG/1
50 TABLET ORAL 2 TIMES DAILY
Qty: 60 TAB | Refills: 6 | Status: SHIPPED | OUTPATIENT
Start: 2017-06-22 | End: 2017-10-12

## 2017-06-22 RX ORDER — METOPROLOL TARTRATE 5 MG/5ML
5 INJECTION INTRAVENOUS ONCE
Status: DISCONTINUED | OUTPATIENT
Start: 2017-06-22 | End: 2017-06-22 | Stop reason: HOSPADM

## 2017-06-22 RX ADMIN — METOPROLOL TARTRATE 50 MG: 50 TABLET ORAL at 17:41

## 2017-06-22 RX ADMIN — AMIODARONE HYDROCHLORIDE 200 MG: 200 TABLET ORAL at 17:44

## 2017-06-22 RX ADMIN — Medication 5 ML: at 14:00

## 2017-06-22 RX ADMIN — METOPROLOL TARTRATE 50 MG: 50 TABLET ORAL at 09:37

## 2017-06-22 RX ADMIN — FINASTERIDE 5 MG: 5 TABLET, FILM COATED ORAL at 09:37

## 2017-06-22 RX ADMIN — AMIODARONE HYDROCHLORIDE 400 MG: 200 TABLET ORAL at 09:37

## 2017-06-22 NOTE — PROGRESS NOTES
Care Management Interventions  PCP Verified by CM: Yes  Transition of Care Consult (CM Consult): [address]: Yes  Current Support Network: Own Home, Family Lives Nearby  Confirm Follow Up Transport: Family  Plan discussed with Pt/Family/Caregiver: Yes  Freedom of Choice Offered: Yes  Discharge Location  Discharge Placement: Home with home health    Had PT eval pt. Unstable, balance issues noted on eval.  Pt states reason as having been in bed since admission. Brothers at bedside. Pt is not agreeble to rehab but is willing to have [facility] nursing and PT. Referral made to [facility]    RW ordered from [address] [place] [address]. To be delivered to room prior to dc. Brothers will check on pt frequently after dc.

## 2017-06-22 NOTE — CONSULTS
Urology Consult    Subjective:     Date of Consultation:  June 21, 2017    Ray Pavon is a 65 y.o. male with darlene of HTN, CKD, hypothryoidism, no prior CAD, no prior CHF, denies bleeding or clotting disorders, who was at [place] primary care today getting ears cleaned out when he was noted to be tachycardic. ECG found him to be in atrial fibrillation with RVR. He was sent to ER for further evaluation. He was unaware of his arrhythmia. He has been given IV cardizem bolus in ER with improvement of rate. No Cp, sob, syncope or near syncope associated. The patient was taken to the catheter lab on 6/24 cardioversion and MONIQUE. He has had at least one dose of heparin. Earlier today he developed gross hematuria that was painless in nature. This is improved somewhat since earlier today. Renal ultrasound was obtained which showed no upper tract abnormality and specifically there was no stone, solid renal mass, or hydronephrosis. There is a suggestion of a large median lobe on one of the images of the bladder. Approximately a year ago the patient was diagnosed with prostate cancer by Dr. Kandi Weeks at [facility]. I don't have access to these records at the time of this dictation but the patient tells me that he thinks his PSA was around 6.5. He was offered radiation therapy or hormonal ablation. He also discussed this with his doctors at the [place] and in the and has decided to follow a course of watchful waiting. It does not appear that he is really getting any follow-up for his prostate cancer at this point. Past Medical History:   Diagnosis Date    CAD (coronary artery disease)     high cholesterol    Cancer (HCC)     prostate    Endocrine disease     thyroid      Past Surgical History:   Procedure Laterality Date    HX PROSTATECTOMY        No family history on file.    Social History   Substance Use Topics    Smoking status: Former Smoker    Smokeless tobacco: Not on file    Alcohol use No     No Known Allergies   Prior to Admission medications    Medication Sig Start Date End Date Taking? Authorizing Provider   simvastatin (ZOCOR) 20 mg tablet Take 20 mg by mouth nightly. Yes Historical Provider   terazosin (HYTRIN) 10 mg capsule Take 10 mg by mouth nightly. Yes Historical Provider   HYDROcodone-acetaminophen (NORCO) 5-325 mg per tablet Take 1 Tab by mouth every four (4) hours as needed for Pain. Max Daily Amount: 6 Tabs. 16  Yes Onur Boss, DO         Review of Systems:  The patient denies flank pain, decreased force of stream, frequency, urgency, or feeling of incomplete emptying. Objective:     Patient Vitals for the past 8 hrs:   BP Temp Pulse Resp SpO2   17 2124 138/74 - 90 - -   17 2042 140/78 98.5 °F (36.9 °C) 87 20 96 %   17 1635 142/75 97.1 °F (36.2 °C) 94 20 97 %     Temp (24hrs), Av °F (36.7 °C), Min:97.1 °F (36.2 °C), Max:98.5 °F (36.9 °C)      Intake and Output:    0701 -  1900  In: 600 [P.O.:600]  Out: 850 [Urine:850]    The patient is in no distress. Vital signs are stable. The abdomen is soft and nontender and there is no palpable mass. Extremities are without cyanosis or edema. The patient is alert and oriented ×3 and there is no focal neurologic deficit. The phallus is unremarkable. Meatus is adequate. Both testicles are descended and normal size, shape, and consistency. There is no evidence of epididymoorchitis, varicocele, or other intrascrotal process. Assessment:     Principal Problem:    Atrial fibrillation with RVR ([facility] (2017)    Active Problems:    HTN (hypertension) (2017)      Dyslipidemia (2017)      Acquired hypothyroidism (2017)      Prostate cancer ([facility] (2017)         Gross hematuria after receiving heparin. Plan:     I would withhold any further anticoagulant therapy if possible. We'll start him on Proscar 5 mg 1 by mouth daily.   After discharge she'll follow-up with us for office cystoscopy. I'll have my appointment  to call him to set this up.     Signed By: Star Jackson MD                         June 21, 2017

## 2017-06-22 NOTE — DISCHARGE SUMMARY
Physician Discharge Summary     Patient ID:  Ray Pavon  [id number]  14 y.o.  1940    Admit date: 6/20/2017    Discharge date and time: 6/22/17    Admitting Physician: Deborah De La Rosa MD     Primary Cardiologist:none    Primary Care MD Bernadette    Discharge Physician: Gato Mclean NP    Admission Diagnoses: Atrial fibrillation with RVR [facility])  Atrial fibrillation with RVR [facility])    Discharge Diagnoses:   Patient Active Problem List    Diagnosis Date Noted    HTN (hypertension) 06/20/2017    Atrial fibrillation with RVR ([facility] [address] 06/20/2017    Dyslipidemia 06/20/2017    Acquired hypothyroidism 06/20/2017    Prostate cancer ([facility] 06/20/2017           Hospital Course: Ray Pavon is a 65 y.o. male with darlene of HTN, CKD, hypothryoidism, no prior CAD, no prior CHF, denies bleeding or clotting disorders, who was at [facility] today getting ears cleaned out when he was noted to be tachycardic. ECG found him to be in atrial fibrillation with RVR. He was sent to ER for further evaluation. He was unaware of his arrhythmia. He has been given IV cardizem bolus in ER with improvement of rate. No Cp, sob, syncope or near syncope associated. Serial cardiac enzymes were negative. MONIQUE noted no evidence of intracardiac thrombus. He was successfully cardioverted to NSR. He was initially placed on IV heparin on admission but developed hematuria. He was seen in consultation by urology and oral anticoagulation was not initiated. He has history of CKD and prostate cancer. His hematuria spontaneously resolved. His renal function remained stable. He was seen on evening of 6/22 by Dr. Chito Escamilla and felt to be acceptable for discharge on amiodarone 200 mg BID and lopressor 50 mg BID for suppression of afib. Office will call pt with follow up appointment in two weeks.          Discharge Exam:     Visit Vitals    BP (!) 167/99 (BP 1 Location: Right arm, BP Patient Position: Sitting)    Pulse 88  Temp 97.7 °F (36.5 °C)    Resp 22    Ht 5' 6.5\" (1.689 m)    Wt 84.8 kg (187 lb)    SpO2 93%    BMI 29.73 kg/m2     General Appearance:  Well developed, well nourished,alert and oriented x 3, and individual in no acute distress. Ears/Nose/Mouth/Throat:   Hearing grossly normal.         Neck: Supple. Chest:   Lungs clear to auscultation bilaterally. Cardiovascular:  Regular rate and rhythm, S1, S2 normal, no murmur. Abdomen:   Soft, non-tender, bowel sounds are active. Extremities: No edema bilaterally. Skin: Warm and dry. Final Laboratory Data:Recent Results (from the past 24 hour(s))   CBC W/O DIFF    Collection Time: 06/22/17 11:52 AM   Result Value Ref Range    WBC 6.5 4.3 - 11.1 K/uL    RBC 4.81 4.23 - 5.67 M/uL    HGB 14.4 13.6 - 17.2 g/dL    HCT 41.2 41.1 - 50.3 %    MCV 85.7 79.6 - 97.8 FL    MCH 29.9 26.1 - 32.9 PG    MCHC 35.0 31.4 - 35.0 g/dL    RDW 13.7 11.9 - 14.6 %    PLATELET 242 843 - 643 K/uL    MPV 10.5 (L) 10.8 - 39.7 FL   METABOLIC PANEL, BASIC    Collection Time: 06/22/17 11:52 AM   Result Value Ref Range    Sodium 145 136 - 145 mmol/L    Potassium 4.4 3.5 - 5.1 mmol/L    Chloride 112 (H) 98 - 107 mmol/L    CO2 26 21 - 32 mmol/L    Anion gap 7 7 - 16 mmol/L    Glucose 118 (H) 65 - 100 mg/dL    BUN 20 8 - 23 MG/DL    Creatinine 1.65 (H) 0.8 - 1.5 MG/DL    GFR est AA 52 (L) >60 ml/min/1.73m2    GFR est non-AA 43 (L) >60 ml/min/1.73m2    Calcium 8.4 8.3 - 10.4 MG/DL   MAGNESIUM    Collection Time: 06/22/17 11:52 AM   Result Value Ref Range    Magnesium 2.1 1.8 - 2.4 mg/dL       Disposition: home    Patient Instructions:   Current Discharge Medication List      START taking these medications    Details   amiodarone (CORDARONE) 200 mg tablet Take 1 Tab by mouth two (2) times a day. Qty: 60 Tab, Refills: 1    Associated Diagnoses: Atrial fibrillation with RVR (HCC)      finasteride (PROSCAR) 5 mg tablet Take 1 Tab by mouth daily.   Qty: 30 Tab, Refills: 6 metoprolol tartrate (LOPRESSOR) 50 mg tablet Take 1 Tab by mouth two (2) times a day. Qty: 60 Tab, Refills: 6    Associated Diagnoses: Atrial fibrillation with RVR ([address]         CONTINUE these medications which have NOT CHANGED    Details   terazosin (HYTRIN) 10 mg capsule Take 10 mg by mouth nightly. HYDROcodone-acetaminophen (NORCO) 5-325 mg per tablet Take 1 Tab by mouth every four (4) hours as needed for Pain. Max Daily Amount: 6 Tabs. Qty: 20 Tab, Refills: 0         STOP taking these medications       simvastatin (ZOCOR) 20 mg tablet Comments:   Reason for Stopping:               Referenced discharge instructions provided by nursing for diet and activity. Follow-up:  Primary Cardiologist:Dr. Nadege Lyons in 2 weeks  PCP: Victor Manuel Zavala MD) in about 4 weeks.     Signed:  Antoni Godoy NP  6/22/2017  5:47 PM

## 2017-06-22 NOTE — PROGRESS NOTES
Discharge instructions reviewed with patient and brother. Prescriptions given for amiodarone, metoprolol and proscar and med info sheets provided for all new medications. Opportunity for questions provided. Patient voiced understanding of all discharge instructions. IVs removed and monitor off at discharge.

## 2017-06-22 NOTE — PROGRESS NOTES
Bedside and Verbal shift change report given to NGUYỄN Mg (oncoming nurse) by self (offgoing nurse). Report included the following information SBAR, Kardex, MAR and Recent Results.

## 2017-06-22 NOTE — PROGRESS NOTES
Problem: Mobility Impaired (Adult and Pediatric)  Goal: *Acute Goals and Plan of Care (Insert Text)  STG:  (1.)Mr. Sterling Junior will move from supine to sit and sit to supine , scoot up and down and roll side to side with STAND BY ASSIST within 3 day(s). (2.)Mr. Sterling Junior will transfer from bed to chair and chair to bed with STAND BY ASSIST using the least restrictive device within 3 day(s). (3.)Mr. Sterling Junior will ambulate with CONTACT GUARD ASSIST for 75 feet with the least restrictive device within 3 day(s). (4.)Mr. Sterling Junior will perform standing static and dynamic balance activities x 10 minutes with CONTACT GUARD ASSIST to improve safety within 3 day(s). (5.)Mr. Sterling Junior will maintain stable vital signs throughout all functional mobility within 3 days. LTG:  (1.)Mr. Sterling Junior will move from supine to sit and sit to supine , scoot up and down and roll side to side in bed with SUPERVISION within 7 day(s). (2.)Mr. Sterling Junior will transfer from bed to chair and chair to bed with SUPERVISION using the least restrictive device within 7 day(s). (3.)Mr. Sterling Junior will ambulate with SUPERVISION for 150+ feet with the least restrictive device within 7 day(s). (4.)Mr. Sterling Junior will ascend and descend 4 stairs using 1-2 hand rail(s) with SUPERVISION to improve functional mobility and safety within 7 day(s).     ________________________________________________________________________________________________      PHYSICAL THERAPY: INITIAL ASSESSMENT, PM 6/22/2017  INPATIENT: Hospital Day: 3  [place] / Product Type: Medicare /      NAME/AGE/GENDER: Arcadio Chatman is a 65 y.o. male       PRIMARY DIAGNOSIS: Atrial fibrillation with RVR (HCC)  Atrial fibrillation with RVR (HCC) Atrial fibrillation with RVR (HCC) Atrial fibrillation with RVR ([address]        ICD-10: Treatment Diagnosis:       · Difficulty in walking, Not elsewhere classified (R26.2) Precaution/Allergies:  Review of patient's allergies indicates no known allergies. ASSESSMENT:      Mr. Debra Marie is a 65 y.o. male with atrial fibrillation with RVR. He lives alone in [facility] and typically ambulates independently, performs ADLs, drives. His brother is present. Patient appears to be compensating for cognitive deficits throughout mobility by using jokes (sometimes inappropriate ones). He is supine and agreeable to therapy, currently on 3 L O2, SpO2 97%. Removed O2 for mobility as patient does not use O2 at home. Required safety cues for all mobility this PM as patient exhibits impulsive tendencies and poor insight. He transferred to standing with minimal assist and almost immediately lost his balance, although attempting to ambulate despite loss of balance. He required moderate assist via hand held assist to ambulate 10', introduced cane and some improvement noted. He still required minimal assist with cane. Upon return to room, patient threw cane on the ground and had to step over it to avoid tripping. He remained in the chair with posey alarm pad placed and all needs in reach with brother present. He is a very high fall risk at this time and will require 24/7 supervision. O2 sats 91% on room air post ambulation, reapplied nasal cannula. RN notified of patient's impulsivity and SW informed of patient's performance. Ray Pavon is currently functioning below his baseline and would benefit from skilled PT during acute care stay to maximize safety and independence with functional mobility. This section established at most recent assessment   PROBLEM LIST (Impairments causing functional limitations):  1. Decreased Strength  2. Decreased ADL/Functional Activities  3. Decreased Transfer Abilities  4. Decreased Ambulation Ability/Technique  5. Decreased Balance  6. Increased Pain  7. Decreased Pacing Skills  8. Increased Shortness of Breath  9.  Decreased Knowledge of Precautions  10. Decreased Douglas with Home Exercise Program  11. Decreased Cognition    INTERVENTIONS PLANNED: (Benefits and precautions of physical therapy have been discussed with the patient.)  1. Balance Exercise  2. Bed Mobility  3. Family Education  4. Gait Training  5. Home Exercise Program (HEP)  6. Therapeutic Activites  7. Therapeutic Exercise/Strengthening  8. Transfer Training  9. Patient Education  10. Group Therapy      TREATMENT PLAN: Frequency/Duration: 3 times a week for duration of hospital stay  Rehabilitation Potential For Stated Goals: GOOD      RECOMMENDED REHABILITATION/EQUIPMENT: (at time of discharge pending progress): Continue Skilled Therapy. HISTORY:   History of Present Injury/Illness (Reason for Referral):  Per MD Note: Brianna Stoner is a 65 y.o. male with darlene of HTN, CKD, hypothryoidism, no prior CAD, no prior CHF, denies bleeding or clotting disorders, who was at [address] [place] [address] primary care today getting ears cleaned out when he was noted to be tachycardic. ECG found him to be in atrial fibrillation with RVR. He was sent to ER for further evaluation. He was unaware of his arrhythmia. He has been given IV cardizem bolus in ER with improvement of rate. No Cp, sob, syncope or near syncope associated. \"  Past Medical History/Comorbidities:   Mr. Fatimah Garrett  has a past medical history of CAD (coronary artery disease); Cancer [facility]); and Endocrine disease. Mr. Fatimah Garrett  has a past surgical history that includes prostatectomy. Social History/Living Environment:   Home Environment: Trailer/mobile home  # Steps to Enter: 4  One/Two Story Residence: One story  Living Alone: Yes  Support Systems: Family member(s)  Patient Expects to be Discharged to[de-identified] Trailer/mobile home  Current DME Used/Available at Home: Cane, straight  Prior Level of Function/Work/Activity:  Patient reports independence with all mobility.       Number of Personal Factors/Comorbidities that affect the Plan of Care: 3+: HIGH COMPLEXITY   EXAMINATION:   Most Recent Physical Functioning:   Gross Assessment:  Strength: Generally decreased, functional  Coordination: Generally decreased, functional  Sensation: Intact               Posture:  Posture (WDL): Exceptions to WDL  Posture Assessment: Forward head  Balance:  Sitting: Impaired  Sitting - Static: Fair (occasional)  Sitting - Dynamic: Fair (occasional)  Standing: Impaired  Standing - Static: Fair  Standing - Dynamic : Poor Bed Mobility:  Supine to Sit: Minimum assistance  Scooting: Minimum assistance  Wheelchair Mobility:     Transfers:  Sit to Stand: Minimum assistance  Stand to Sit: Minimum assistance  Bed to Chair: Moderate assistance  Gait:     Base of Support: Widened;Center of gravity altered  Speed/Kellee: Shuffled;Slow;Fluctuations  Gait Abnormalities: Decreased step clearance;Trunk sway increased; Path deviations  Distance (ft): 50 Feet (ft)  Assistive Device: Cane, straight  Ambulation - Level of Assistance: Minimal assistance; Moderate assistance  Interventions: Safety awareness training;Manual cues; Verbal cues; Visual/Demos       Body Structures Involved:  1. Heart  2. Lungs  3. Muscles Body Functions Affected:  1. Mental  2. Cardio  3. Respiratory  4. Neuromusculoskeletal  5. Movement Related Activities and Participation Affected:  1. Learning and Applying Knowledge  2. Mobility  3. Self Care  4. Domestic Life  5. Interpersonal Interactions and Relationships  6. Community, Social and [place]   Number of elements that affect the Plan of Care: 4+: HIGH COMPLEXITY   CLINICAL PRESENTATION:   Presentation: Evolving clinical presentation with changing clinical characteristics: MODERATE COMPLEXITY   CLINICAL DECISION MAKIN [facility] Mobility Inpatient Short Form  How much difficulty does the patient currently have. .. Unable A Lot A Little None   1.   Turning over in bed (including adjusting bedclothes, sheets and blankets)? [ ] 1   [ ] 2   [X] 3   [ ] 4   2. Sitting down on and standing up from a chair with arms ( e.g., wheelchair, bedside commode, etc.)   [ ] 1   [ ] 2   [X] 3   [ ] 4   3. Moving from lying on back to sitting on the side of the bed? [ ] 1   [ ] 2   [X] 3   [ ] 4   How much help from another person does the patient currently need. .. Total A Lot A Little None   4. Moving to and from a bed to a chair (including a wheelchair)? [ ] 1   [X] 2   [ ] 3   [ ] 4   5. Need to walk in hospital room? [ ] 1   [X] 2   [ ] 3   [ ] 4   6. Climbing 3-5 steps with a railing? [X] 1   [ ] 2   [ ] 3   [ ] 4   © 2007, Trustees of [address] [place] [id number], under license to [employer]. All rights reserved    Score:  Initial: 14 Most Recent: X (Date: -- )     Interpretation of Tool:  Represents activities that are increasingly more difficult (i.e. Bed mobility, Transfers, Gait). Score 24 23 22-20 19-15 14-10 9-7 6       Modifier CH CI CJ CK CL CM CN         · Mobility - Walking and Moving Around:               - CURRENT STATUS:    CL - 60%-79% impaired, limited or restricted               - GOAL STATUS:           CK - 40%-59% impaired, limited or restricted               - D/C STATUS:                       ---------------To be determined---------------  [place] / Product Type: Medicare /       Medical Necessity:     · Patient demonstrates good rehab potential due to higher previous functional level. Reason for Services/Other Comments:  · Patient continues to require modification of therapeutic interventions to increase complexity of exercises.    Use of outcome tool(s) and clinical judgement create a POC that gives a: Questionable prediction of patient's progress: MODERATE COMPLEXITY                 TREATMENT:   (In addition to Assessment/Re-Assessment sessions the following treatments were rendered)   Pre-treatment Symptoms/Complaints:  none  Pain: Initial: Pain Intensity 1: 0  Post Session:  0/10      Assessment/Reassessment only, no treatment provided today     Braces/Orthotics/Lines/Etc:   · O2 Device: Nasal cannula  Treatment/Session Assessment:    · Response to Treatment:  Patient tolerated treatment well. · Interdisciplinary Collaboration:  · Physical Therapist  · Registered Nurse  ·   · After treatment position/precautions:  · Up in chair  · Bed alarm/tab alert on  · Bed/Chair-wheels locked  · Bed in low position  · Call light within reach  · RN notified  · Family at bedside  · Compliance with Program/Exercises: Will assess as treatment progresses. · Recommendations/Intent for next treatment session: \"Next visit will focus on advancements to more challenging activities and reduction in assistance provided\".   Total Treatment Duration:  PT Patient Time In/Time Out  Time In: 1328  Time Out: [address], DPT

## 2017-06-22 NOTE — DISCHARGE INSTRUCTIONS
Atrial Fibrillation: Care Instructions  Your Care Instructions    Atrial fibrillation is an irregular and often fast heartbeat. Treating this condition is important for several reasons. It can cause blood clots, which can travel from your heart to your brain and cause a stroke. If you have a fast heartbeat, you may feel lightheaded, dizzy, and weak. An irregular heartbeat can also increase your risk for heart failure. Atrial fibrillation is often the result of another heart condition, such as high blood pressure or coronary artery disease. Making changes to improve your heart condition will help you stay healthy and active. Follow-up care is a key part of your treatment and safety. Be sure to make and go to all appointments, and call your doctor if you are having problems. It's also a good idea to know your test results and keep a list of the medicines you take. How can you care for yourself at home? Medicines  · Take your medicines exactly as prescribed. Call your doctor if you think you are having a problem with your medicine. You will get more details on the specific medicines your doctor prescribes. · If your doctor has given you a blood thinner to prevent a stroke, be sure you get instructions about how to take your medicine safely. Blood thinners can cause serious bleeding problems. · Do not take any vitamins, over-the-counter drugs, or herbal products without talking to your doctor first.  Lifestyle changes  · Do not smoke. Smoking can increase your chance of a stroke and heart attack. If you need help quitting, talk to your doctor about stop-smoking programs and medicines. These can increase your chances of quitting for good. · Eat a heart-healthy diet. · Stay at a healthy weight. Lose weight if you need to. · Limit alcohol to 2 drinks a day for men and 1 drink a day for women. Too much alcohol can cause health problems. · Avoid colds and flu. Get a pneumococcal vaccine shot.  If you have had one before, ask your doctor whether you need another dose. Get a flu shot every year. If you must be around people with colds or flu, wash your hands often. Activity  · If your doctor recommends it, get more exercise. Walking is a good choice. Bit by bit, increase the amount you walk every day. Try for at least 30 minutes on most days of the week. You also may want to swim, bike, or do other activities. Your doctor may suggest that you join a cardiac rehabilitation program so that you can have help increasing your physical activity safely. · Start light exercise if your doctor says it is okay. Even a small amount will help you get stronger, have more energy, and manage stress. Walking is an easy way to get exercise. Start out by walking a little more than you did in the hospital. Gradually increase the amount you walk. · When you exercise, watch for signs that your heart is working too hard. You are pushing too hard if you cannot talk while you are exercising. If you become short of breath or dizzy or have chest pain, sit down and rest immediately. · Check your pulse regularly. Place two fingers on the artery at the palm side of your wrist, in line with your thumb. If your heartbeat seems uneven or fast, talk to your doctor. When should you call for help? Call 911 anytime you think you may need emergency care. For example, call if:  · You have symptoms of a heart attack. These may include:  ¨ Chest pain or pressure, or a strange feeling in the chest.  ¨ Sweating. ¨ Shortness of breath. ¨ Nausea or vomiting. ¨ Pain, pressure, or a strange feeling in the back, neck, jaw, or upper belly or in one or both shoulders or arms. ¨ Lightheadedness or sudden weakness. ¨ A fast or irregular heartbeat. After you call 911, the  may tell you to chew 1 adult-strength or 2 to 4 low-dose aspirin. Wait for an ambulance. Do not try to drive yourself. · You have symptoms of a stroke.  These may include:  ¨ Sudden numbness, tingling, weakness, or loss of movement in your face, arm, or leg, especially on only one side of your body. ¨ Sudden vision changes. ¨ Sudden trouble speaking. ¨ Sudden confusion or trouble understanding simple statements. ¨ Sudden problems with walking or balance. ¨ A sudden, severe headache that is different from past headaches. · You passed out (lost consciousness). Call your doctor now or seek immediate medical care if:  · You have new or increased shortness of breath. · You feel dizzy or lightheaded, or you feel like you may faint. · Your heart rate becomes irregular. · You can feel your heart flutter in your chest or skip heartbeats. Tell your doctor if these symptoms are new or worse. Watch closely for changes in your health, and be sure to contact your doctor if you have any problems. Where can you learn more? Go to http://marcin-matt.info/. Enter U020 in the search box to learn more about \"Atrial Fibrillation: Care Instructions. \"  Current as of: September 21, 2016  Content Version: 11.3  © 3578-8518 [employer]. Care instructions adapted under license by [employer] (which disclaims liability or warranty for this information). If you have questions about a medical condition or this instruction, always ask your healthcare professional. [address] any warranty or liability for your use of this information. Learning About Cardioversion  What is cardioversion? Cardioversion helps your heart return to a normal rhythm. It treats problems like atrial fibrillation. It is also sometimes used in emergencies. It can correct a fast heartbeat that causes low blood pressure, chest pain, or heart failure. There are two types:  · The electrical type uses an electric current. The current enters your body through patches on your chest or back. · The chemical type uses medicines.  The medicine is usually put into your arm through a tube called an IV. How is cardioversion done? Your doctor may ask you to take medicines before the treatment. These help prevent blood clots. Your doctor will watch you closely to make sure that there are no problems. Electrical cardioversion  The electrical procedure is done in a hospital. You will get medicine to help you relax and control the pain. Your doctor will put patches on your chest or back. The patches send an electric current to your heart. This resets your heart rhythm. The electrical part takes about 5 minutes. But you will probably be in the hospital for 1 to 2 hours. You will need to recover from the effects of the sedative medicine. Chemical cardioversion  The chemical procedure is most often done in a hospital. In most cases, the medicine is put into your arm through a tube called an IV. But you may get medicines to take by mouth. You may feel a quick sting or pinch when the IV starts. The procedure usually takes about 4 to 8 hours. What can you expect after cardioversion? · You can usually go home the same day. You will need someone to drive you home. · Your doctor may have you take medicines daily. These help your heart beat normally and prevent blood clots. · After electrical cardioversion, you may have redness where the patches were. This looks and feels like a sunburn. · Abnormal heart rhythms sometimes come back after cardioversion. Follow-up care is a key part of your treatment and safety. Be sure to make and go to all appointments, and call your doctor if you are having problems. It's also a good idea to know your test results and keep a list of the medicines you take. Where can you learn more? Go to http://marcin-matt.info/. Enter W719 in the search box to learn more about \"Learning About Cardioversion. \"  Current as of: April 3, 2017  Content Version: 11.3  © 7817-0198 [employer].  Care instructions adapted under license by [address] (which disclaims liability or warranty for this information). If you have questions about a medical condition or this instruction, always ask your healthcare professional. [address] any warranty or liability for your use of this information.

## 2017-06-22 NOTE — PROGRESS NOTES
[address]  Face to Face Encounter    Patients Name: Deisy Carbone    YOB: 1940    Ordering Physician:  Dr. Zay Sandoval    Primary Diagnosis: Atrial fibrillation with [facility])  Atrial fibrillation with [facility])    Date of Face to Face:   6/22/2017                                  Face to Face Encounter findings are related to primary reason for home care:   yes. 1. I certify that the patient needs intermittent care as follows: skilled nursing care:  skilled observation/assessment, patient education; PT - eval and treat    2. I certify that this patient is homebound, that is: 1) patient requires the use of a walker device, special transportation, or assistance of another to leave the home; or 2) patient's condition makes leaving the home medically contraindicated; and 3) patient has a normal inability to leave the home and leaving the home requires considerable and taxing effort. Patient may leave the home for infrequent and short duration for medical reasons, and occasional absences for non-medical reasons. Homebound status is due to the following functional limitations: Patient with strength deficits limiting the performance of all ADL's without caregiver assistance or the use of an assistive device. 3. I certify that this patient is under my care and that I, or a nurse practitioner or [phone], or clinical nurse specialist, or certified nurse midwife, working with me, had a Face-to-Face Encounter that meets the physician Face-to-Face Encounter requirements.   The following are the clinical findings from the [address] [place] [address] encounter that support the need for skilled services and is a summary of the encounter:    See hospital chart      Kayli JESSY Diaz  6/22/2017      THE FOLLOWING TO BE COMPLETED BY THE COMMUNITY PHYSICIAN:    I concur with the findings described above from the [facility] encounter that this patient is homebound and in need of a skilled service.     Certifying Physician: _____________________________________      Printed Certifying Physician Name: _____________________________________    Date: _________________

## 2017-06-28 NOTE — PROCEDURES
[address]       Name:  Trish Tripathi   MR#:  [id number]   :  1940   Account #:  [de-identified]   Date of Adm:  2017       DATE OF PROCEDURE: 2017    PROCEDURE: Electrical cardioversion. HISTORY: This is a 59-year-old gentleman for electrical   cardioversion. After adequate sedation, a transesophageal echocardiogram was   performed which showed no clot. He then received an electric   shock, 200 joules. This was successful in converting him from   atrial fibrillation to normal sinus rhythm. There were no   complications.          MD uAbrie Avendano / Jacqueline Vega   D:  2017   09:01   T:  2017   14:47   Job #:  [id number]

## 2017-10-12 PROBLEM — I48.0 PAROXYSMAL ATRIAL FIBRILLATION (HCC): Status: ACTIVE | Noted: 2017-10-12

## 2020-02-11 ENCOUNTER — APPOINTMENT (OUTPATIENT)
Dept: GENERAL RADIOLOGY | Age: 80
DRG: 191 | End: 2020-02-11
Attending: EMERGENCY MEDICINE
Payer: MEDICARE

## 2020-02-11 ENCOUNTER — HOSPITAL ENCOUNTER (INPATIENT)
Age: 80
LOS: 1 days | Discharge: HOME HEALTH | DRG: 191 | End: 2020-02-13
Attending: EMERGENCY MEDICINE | Admitting: HOSPITALIST
Payer: MEDICARE

## 2020-02-11 DIAGNOSIS — R09.02 HYPOXIA: ICD-10-CM

## 2020-02-11 DIAGNOSIS — J44.1 ACUTE EXACERBATION OF CHRONIC OBSTRUCTIVE PULMONARY DISEASE (COPD) (HCC): Primary | ICD-10-CM

## 2020-02-11 LAB
ALBUMIN SERPL-MCNC: 3.2 G/DL (ref 3.2–4.6)
ALBUMIN/GLOB SERPL: 0.8 {RATIO} (ref 1.2–3.5)
ALP SERPL-CCNC: 77 U/L (ref 50–136)
ALT SERPL-CCNC: 38 U/L (ref 12–65)
ANION GAP SERPL CALC-SCNC: 7 MMOL/L (ref 7–16)
ARTERIAL PATENCY WRIST A: YES
AST SERPL-CCNC: 33 U/L (ref 15–37)
BACTERIA URNS QL MICRO: 0 /HPF
BASE EXCESS BLD CALC-SCNC: 1 MMOL/L
BASOPHILS # BLD: 0 K/UL (ref 0–0.2)
BASOPHILS NFR BLD: 0 % (ref 0–2)
BDY SITE: ABNORMAL
BILIRUB SERPL-MCNC: 1.2 MG/DL (ref 0.2–1.1)
BUN SERPL-MCNC: 27 MG/DL (ref 8–23)
CALCIUM SERPL-MCNC: 8.9 MG/DL (ref 8.3–10.4)
CASTS URNS QL MICRO: NORMAL /LPF
CHLORIDE SERPL-SCNC: 105 MMOL/L (ref 98–107)
CK SERPL-CCNC: 157 U/L (ref 21–215)
CO2 BLD-SCNC: 25 MMOL/L
CO2 SERPL-SCNC: 29 MMOL/L (ref 21–32)
COLLECT TIME,HTIME: 1426
CREAT SERPL-MCNC: 2.44 MG/DL (ref 0.8–1.5)
DIFFERENTIAL METHOD BLD: ABNORMAL
EOSINOPHIL # BLD: 0.1 K/UL (ref 0–0.8)
EOSINOPHIL NFR BLD: 1 % (ref 0.5–7.8)
EPI CELLS #/AREA URNS HPF: NORMAL /HPF
ERYTHROCYTE [DISTWIDTH] IN BLOOD BY AUTOMATED COUNT: 14.2 % (ref 11.9–14.6)
FLOW RATE ISTAT,IFRATE: 3 L/MIN
GAS FLOW.O2 O2 DELIVERY SYS: ABNORMAL L/MIN
GLOBULIN SER CALC-MCNC: 3.9 G/DL (ref 2.3–3.5)
GLUCOSE SERPL-MCNC: 140 MG/DL (ref 65–100)
HCO3 BLD-SCNC: 23.9 MMOL/L (ref 22–26)
HCT VFR BLD AUTO: 42.6 % (ref 41.1–50.3)
HGB BLD-MCNC: 13.9 G/DL (ref 13.6–17.2)
IMM GRANULOCYTES # BLD AUTO: 0.1 K/UL (ref 0–0.5)
IMM GRANULOCYTES NFR BLD AUTO: 1 % (ref 0–5)
INR PPP: 2.5
LYMPHOCYTES # BLD: 0.8 K/UL (ref 0.5–4.6)
LYMPHOCYTES NFR BLD: 12 % (ref 13–44)
MAGNESIUM SERPL-MCNC: 2 MG/DL (ref 1.8–2.4)
MCH RBC QN AUTO: 29 PG (ref 26.1–32.9)
MCHC RBC AUTO-ENTMCNC: 32.6 G/DL (ref 31.4–35)
MCV RBC AUTO: 88.8 FL (ref 79.6–97.8)
MONOCYTES # BLD: 0.8 K/UL (ref 0.1–1.3)
MONOCYTES NFR BLD: 12 % (ref 4–12)
NEUTS SEG # BLD: 5 K/UL (ref 1.7–8.2)
NEUTS SEG NFR BLD: 74 % (ref 43–78)
NRBC # BLD: 0 K/UL (ref 0–0.2)
PCO2 BLD: 33.2 MMHG (ref 35–45)
PH BLD: 7.46 [PH] (ref 7.35–7.45)
PLATELET # BLD AUTO: 152 K/UL (ref 150–450)
PMV BLD AUTO: 9.6 FL (ref 9.4–12.3)
PO2 BLD: 48 MMHG (ref 75–100)
POTASSIUM SERPL-SCNC: 4 MMOL/L (ref 3.5–5.1)
PROT SERPL-MCNC: 7.1 G/DL (ref 6.3–8.2)
PROTHROMBIN TIME: 27.8 SEC (ref 12–14.7)
RBC # BLD AUTO: 4.8 M/UL (ref 4.23–5.6)
RBC #/AREA URNS HPF: NORMAL /HPF
SAO2 % BLD: 86 % (ref 95–98)
SERVICE CMNT-IMP: ABNORMAL
SODIUM SERPL-SCNC: 141 MMOL/L (ref 136–145)
SPECIMEN TYPE: ABNORMAL
WBC # BLD AUTO: 6.7 K/UL (ref 4.3–11.1)
WBC URNS QL MICRO: NORMAL /HPF

## 2020-02-11 PROCEDURE — 94760 N-INVAS EAR/PLS OXIMETRY 1: CPT

## 2020-02-11 PROCEDURE — 82550 ASSAY OF CK (CPK): CPT

## 2020-02-11 PROCEDURE — 83735 ASSAY OF MAGNESIUM: CPT

## 2020-02-11 PROCEDURE — 99285 EMERGENCY DEPT VISIT HI MDM: CPT

## 2020-02-11 PROCEDURE — 74011000250 HC RX REV CODE- 250: Performed by: EMERGENCY MEDICINE

## 2020-02-11 PROCEDURE — 85025 COMPLETE CBC W/AUTO DIFF WBC: CPT

## 2020-02-11 PROCEDURE — 77010033678 HC OXYGEN DAILY

## 2020-02-11 PROCEDURE — 81003 URINALYSIS AUTO W/O SCOPE: CPT

## 2020-02-11 PROCEDURE — 82803 BLOOD GASES ANY COMBINATION: CPT

## 2020-02-11 PROCEDURE — 77030013140 HC MSK NEB VYRM -A

## 2020-02-11 PROCEDURE — 93005 ELECTROCARDIOGRAM TRACING: CPT | Performed by: EMERGENCY MEDICINE

## 2020-02-11 PROCEDURE — 74011250637 HC RX REV CODE- 250/637: Performed by: HOSPITALIST

## 2020-02-11 PROCEDURE — 80053 COMPREHEN METABOLIC PANEL: CPT

## 2020-02-11 PROCEDURE — 36415 COLL VENOUS BLD VENIPUNCTURE: CPT

## 2020-02-11 PROCEDURE — 74011000250 HC RX REV CODE- 250: Performed by: HOSPITALIST

## 2020-02-11 PROCEDURE — 74011250636 HC RX REV CODE- 250/636: Performed by: HOSPITALIST

## 2020-02-11 PROCEDURE — 99218 HC RM OBSERVATION: CPT

## 2020-02-11 PROCEDURE — 81015 MICROSCOPIC EXAM OF URINE: CPT

## 2020-02-11 PROCEDURE — 94640 AIRWAY INHALATION TREATMENT: CPT

## 2020-02-11 PROCEDURE — 85610 PROTHROMBIN TIME: CPT

## 2020-02-11 PROCEDURE — 36600 WITHDRAWAL OF ARTERIAL BLOOD: CPT

## 2020-02-11 PROCEDURE — 71046 X-RAY EXAM CHEST 2 VIEWS: CPT

## 2020-02-11 RX ORDER — GUAIFENESIN 600 MG/1
600 TABLET, EXTENDED RELEASE ORAL EVERY 12 HOURS
Status: DISCONTINUED | OUTPATIENT
Start: 2020-02-11 | End: 2020-02-13 | Stop reason: HOSPADM

## 2020-02-11 RX ORDER — ACETAMINOPHEN 325 MG/1
650 TABLET ORAL
Status: DISCONTINUED | OUTPATIENT
Start: 2020-02-11 | End: 2020-02-13 | Stop reason: HOSPADM

## 2020-02-11 RX ORDER — ONDANSETRON 2 MG/ML
4 INJECTION INTRAMUSCULAR; INTRAVENOUS
Status: DISCONTINUED | OUTPATIENT
Start: 2020-02-11 | End: 2020-02-13 | Stop reason: HOSPADM

## 2020-02-11 RX ORDER — ATORVASTATIN CALCIUM 10 MG/1
20 TABLET, FILM COATED ORAL
Status: DISCONTINUED | OUTPATIENT
Start: 2020-02-11 | End: 2020-02-13 | Stop reason: HOSPADM

## 2020-02-11 RX ORDER — IPRATROPIUM BROMIDE AND ALBUTEROL SULFATE 2.5; .5 MG/3ML; MG/3ML
3 SOLUTION RESPIRATORY (INHALATION)
Status: DISCONTINUED | OUTPATIENT
Start: 2020-02-11 | End: 2020-02-13 | Stop reason: HOSPADM

## 2020-02-11 RX ORDER — SODIUM CHLORIDE 0.9 % (FLUSH) 0.9 %
5-40 SYRINGE (ML) INJECTION AS NEEDED
Status: DISCONTINUED | OUTPATIENT
Start: 2020-02-11 | End: 2020-02-13 | Stop reason: HOSPADM

## 2020-02-11 RX ORDER — NALOXONE HYDROCHLORIDE 0.4 MG/ML
0.4 INJECTION, SOLUTION INTRAMUSCULAR; INTRAVENOUS; SUBCUTANEOUS AS NEEDED
Status: DISCONTINUED | OUTPATIENT
Start: 2020-02-11 | End: 2020-02-13 | Stop reason: HOSPADM

## 2020-02-11 RX ORDER — LEVOTHYROXINE SODIUM 50 UG/1
50 TABLET ORAL
Status: DISCONTINUED | OUTPATIENT
Start: 2020-02-12 | End: 2020-02-13 | Stop reason: HOSPADM

## 2020-02-11 RX ORDER — HYDROCODONE BITARTRATE AND ACETAMINOPHEN 5; 325 MG/1; MG/1
1 TABLET ORAL
Status: DISCONTINUED | OUTPATIENT
Start: 2020-02-11 | End: 2020-02-13 | Stop reason: HOSPADM

## 2020-02-11 RX ORDER — HYDRALAZINE HYDROCHLORIDE 25 MG/1
25 TABLET, FILM COATED ORAL
Status: DISCONTINUED | OUTPATIENT
Start: 2020-02-11 | End: 2020-02-13 | Stop reason: HOSPADM

## 2020-02-11 RX ORDER — SODIUM CHLORIDE 0.9 % (FLUSH) 0.9 %
5-40 SYRINGE (ML) INJECTION EVERY 8 HOURS
Status: DISCONTINUED | OUTPATIENT
Start: 2020-02-11 | End: 2020-02-13 | Stop reason: HOSPADM

## 2020-02-11 RX ORDER — MORPHINE SULFATE 2 MG/ML
2 INJECTION, SOLUTION INTRAMUSCULAR; INTRAVENOUS
Status: DISCONTINUED | OUTPATIENT
Start: 2020-02-11 | End: 2020-02-13 | Stop reason: HOSPADM

## 2020-02-11 RX ORDER — AMIODARONE HYDROCHLORIDE 200 MG/1
200 TABLET ORAL DAILY
Status: DISCONTINUED | OUTPATIENT
Start: 2020-02-12 | End: 2020-02-13 | Stop reason: HOSPADM

## 2020-02-11 RX ORDER — ZOLPIDEM TARTRATE 5 MG/1
5 TABLET ORAL
Status: DISCONTINUED | OUTPATIENT
Start: 2020-02-11 | End: 2020-02-13 | Stop reason: HOSPADM

## 2020-02-11 RX ORDER — WARFARIN SODIUM 5 MG/1
5 TABLET ORAL EVERY EVENING
Status: DISCONTINUED | OUTPATIENT
Start: 2020-02-12 | End: 2020-02-13 | Stop reason: HOSPADM

## 2020-02-11 RX ORDER — ALBUTEROL SULFATE 2.5 MG/.5ML
2.5 SOLUTION RESPIRATORY (INHALATION)
Status: COMPLETED | OUTPATIENT
Start: 2020-02-11 | End: 2020-02-11

## 2020-02-11 RX ORDER — SODIUM CHLORIDE 9 MG/ML
50 INJECTION, SOLUTION INTRAVENOUS CONTINUOUS
Status: DISCONTINUED | OUTPATIENT
Start: 2020-02-11 | End: 2020-02-13

## 2020-02-11 RX ORDER — METOPROLOL TARTRATE 25 MG/1
25 TABLET, FILM COATED ORAL 2 TIMES DAILY
Status: DISCONTINUED | OUTPATIENT
Start: 2020-02-11 | End: 2020-02-13 | Stop reason: HOSPADM

## 2020-02-11 RX ORDER — TERAZOSIN 5 MG/1
10 CAPSULE ORAL
Status: DISCONTINUED | OUTPATIENT
Start: 2020-02-11 | End: 2020-02-13 | Stop reason: HOSPADM

## 2020-02-11 RX ORDER — ADHESIVE BANDAGE
30 BANDAGE TOPICAL DAILY PRN
Status: DISCONTINUED | OUTPATIENT
Start: 2020-02-11 | End: 2020-02-13 | Stop reason: HOSPADM

## 2020-02-11 RX ORDER — FINASTERIDE 5 MG/1
5 TABLET, FILM COATED ORAL DAILY
Status: DISCONTINUED | OUTPATIENT
Start: 2020-02-12 | End: 2020-02-13 | Stop reason: HOSPADM

## 2020-02-11 RX ORDER — PREDNISONE 20 MG/1
40 TABLET ORAL
Status: DISCONTINUED | OUTPATIENT
Start: 2020-02-11 | End: 2020-02-13 | Stop reason: HOSPADM

## 2020-02-11 RX ORDER — DIPHENHYDRAMINE HCL 25 MG
25 CAPSULE ORAL
Status: DISCONTINUED | OUTPATIENT
Start: 2020-02-11 | End: 2020-02-13 | Stop reason: HOSPADM

## 2020-02-11 RX ADMIN — GUAIFENESIN 600 MG: 600 TABLET ORAL at 20:54

## 2020-02-11 RX ADMIN — ATORVASTATIN CALCIUM 20 MG: 10 TABLET, FILM COATED ORAL at 21:00

## 2020-02-11 RX ADMIN — METOPROLOL TARTRATE 25 MG: 25 TABLET ORAL at 20:54

## 2020-02-11 RX ADMIN — Medication 10 ML: at 21:50

## 2020-02-11 RX ADMIN — ZOLPIDEM TARTRATE 5 MG: 5 TABLET, COATED ORAL at 22:00

## 2020-02-11 RX ADMIN — SODIUM CHLORIDE 100 ML/HR: 900 INJECTION, SOLUTION INTRAVENOUS at 21:10

## 2020-02-11 RX ADMIN — TERAZOSIN HYDROCHLORIDE 10 MG: 5 CAPSULE ORAL at 21:00

## 2020-02-11 RX ADMIN — IPRATROPIUM BROMIDE AND ALBUTEROL SULFATE 3 ML: .5; 3 SOLUTION RESPIRATORY (INHALATION) at 19:22

## 2020-02-11 RX ADMIN — ALBUTEROL SULFATE 2.5 MG: 2.5 SOLUTION RESPIRATORY (INHALATION) at 15:50

## 2020-02-11 NOTE — ED TRIAGE NOTES
Patient comes from home via EMS for fall with generalized weakness. Denies LOC or hitting head. Patient has no complaints at this time.  MD to triage for assessment

## 2020-02-11 NOTE — H&P
H&P      Patient: Rachel Cruz               Sex: male             MRN: [id number]      YOB: 1940      Age:  79 y.o. Chief Complaint:  Generalized weakness, fall, cough    HPI     This is a 69-year-old gentleman with history of hypertension, hypothyroidism, history of prostate cancer, A. fib on anticoagulation with Coumadin, CKD stage III/IV, possible COPD, was brought into the emergency room after he fell today. Patient states he lost his balance and fell to the ground and could not get up. Did not had any shortness of breath or dizziness or chest pain before the fall. No head trauma and no loss of consciousness. Patient denies any severe injury and is not complaining of any pain in any of the joints or hip. When he was initially evaluated in the emergency room, he was found to be hypoxemic, placed on supplemental oxygen and hospitalist service was consulted. Patient was complaining of cough which is nonproductive, started 5 days ago, progressively getting worse, worse with exertion, somewhat better with rest.  No fever or chills. No chest pain or abdominal pain or nausea or vomiting. No diarrhea. No burning urination. No blood in the urine. No recent travel or sick contacts. Review of Systems  Comprehensive 10 point ROS is done, and pertinent positives & negatives per HPI, rest of them are negative. Past Medical History:   Diagnosis Date    CAD (coronary artery disease)     high cholesterol    Cancer (HCC)     prostate    Endocrine disease     thyroid       Past Surgical History:   Procedure Laterality Date    HX PROSTATECTOMY         Family history: Reviewed and significant for heart failure in mother who  at the age of 80.     Social History     Socioeconomic History    Marital status:      Spouse name: Not on file    Number of children: Not on file    Years of education: Not on file    Highest education level: Not on file   Tobacco Use  Smoking status: Former Smoker    Smokeless tobacco: Never Used   Substance and Sexual Activity    Alcohol use: No       No Known Allergies    Prior to Admission medications    Medication Sig Start Date End Date Taking? Authorizing Provider   metoprolol tartrate (LOPRESSOR) 25 mg tablet Take  by mouth two (2) times a day. Provider, Historical   warfarin (COUMADIN) 5 mg tablet Take 1 Tab by mouth daily. 10/12/17   Thais Inman MD   amLODIPine (NORVASC) 5 mg tablet Take 5 mg by mouth daily. Provider, Historical   atorvastatin (LIPITOR) 40 mg tablet Take 20 mg by mouth daily. Provider, Historical   levothyroxine (SYNTHROID) 50 mcg tablet Take  by mouth Daily (before breakfast). Provider, Historical   amiodarone (CORDARONE) 200 mg tablet Take 1 Tab by mouth two (2) times a day. 17   Sudhir Macario MD   finasteride (PROSCAR) 5 mg tablet Take 1 Tab by mouth daily. 17   Sudhir Macario MD   terazosin (HYTRIN) 10 mg capsule Take 10 mg by mouth nightly. Provider, Historical         Physical Exam     Visit Vitals  /87 (BP 1 Location: Left arm, BP Patient Position: At rest)   Pulse 79   Temp 98.7 °F (37.1 °C)   Resp 18   Ht 5' 6\" (1.676 m)   Wt 80.3 kg (177 lb)   SpO2 98%   BMI 28.57 kg/m²      Temp (24hrs), Av.7 °F (37.1 °C), Min:98.7 °F (37.1 °C), Max:98.7 °F (37.1 °C)    Oxygen Therapy  O2 Sat (%): 98 % (20)  Pulse via Oximetry: 75 beats per minute (20 1605)  O2 Device: Nasal cannula (20)  O2 Flow Rate (L/min): 2 l/min (20)  No intake or output data in the 24 hours ending 20 1753     General: Conscious, No acute distress, able to speak in full sentences. Eyes:  OLESYA, No pallor/icterus    HENT:             Oral Mucosa is dry. No sinus tenderness  Neck:               Supple, No JVD  Lungs:  Diminished air entry at the bases with moderate expiratory wheezing.   Heart:  S1 S2 regular  Abdomen: Soft, Positive bowel sounds, NTND, No guarding/rigidity/rebound tend. Extremities: No pedal edema  Neurologic:  AAOX3, No acute FND, Motor: LUE: 5/5, LLE: 5/5, RUE: 5/5, RLE: 5/5  Skin:                No acute rashes  Musculoskeletal: No Acute findings  Psych:             Appropriate mood    LAB  Recent Results (from the past 24 hour(s))   CBC WITH AUTOMATED DIFF    Collection Time: 02/11/20  2:19 PM   Result Value Ref Range    WBC 6.7 4.3 - 11.1 K/uL    RBC 4.80 4.23 - 5.6 M/uL    HGB 13.9 13.6 - 17.2 g/dL    HCT 42.6 41.1 - 50.3 %    MCV 88.8 79.6 - 97.8 FL    MCH 29.0 26.1 - 32.9 PG    MCHC 32.6 31.4 - 35.0 g/dL    RDW 14.2 11.9 - 14.6 %    PLATELET 345 663 - 741 K/uL    MPV 9.6 9.4 - 12.3 FL    ABSOLUTE NRBC 0.00 0.0 - 0.2 K/uL    DF AUTOMATED      NEUTROPHILS 74 43 - 78 %    LYMPHOCYTES 12 (L) 13 - 44 %    MONOCYTES 12 4.0 - 12.0 %    EOSINOPHILS 1 0.5 - 7.8 %    BASOPHILS 0 0.0 - 2.0 %    IMMATURE GRANULOCYTES 1 0.0 - 5.0 %    ABS. NEUTROPHILS 5.0 1.7 - 8.2 K/UL    ABS. LYMPHOCYTES 0.8 0.5 - 4.6 K/UL    ABS. MONOCYTES 0.8 0.1 - 1.3 K/UL    ABS. EOSINOPHILS 0.1 0.0 - 0.8 K/UL    ABS. BASOPHILS 0.0 0.0 - 0.2 K/UL    ABS. IMM.  GRANS. 0.1 0.0 - 0.5 K/UL   MAGNESIUM    Collection Time: 02/11/20  2:19 PM   Result Value Ref Range    Magnesium 2.0 1.8 - 2.4 mg/dL   PROTHROMBIN TIME + INR    Collection Time: 02/11/20  2:19 PM   Result Value Ref Range    Prothrombin time 27.8 (H) 12.0 - 14.7 sec    INR 2.5     METABOLIC PANEL, COMPREHENSIVE    Collection Time: 02/11/20  2:19 PM   Result Value Ref Range    Sodium 141 136 - 145 mmol/L    Potassium 4.0 3.5 - 5.1 mmol/L    Chloride 105 98 - 107 mmol/L    CO2 29 21 - 32 mmol/L    Anion gap 7 7 - 16 mmol/L    Glucose 140 (H) 65 - 100 mg/dL    BUN 27 (H) 8 - 23 MG/DL    Creatinine 2.44 (H) 0.8 - 1.5 MG/DL    GFR est AA 33 (L) >60 ml/min/1.73m2    GFR est non-AA 27 (L) >60 ml/min/1.73m2    Calcium 8.9 8.3 - 10.4 MG/DL    Bilirubin, total 1.2 (H) 0.2 - 1.1 MG/DL    ALT (SGPT) 38 12 - 65 U/L    AST (SGOT) 33 15 - 37 U/L    Alk. phosphatase 77 50 - 136 U/L    Protein, total 7.1 6.3 - 8.2 g/dL    Albumin 3.2 3.2 - 4.6 g/dL    Globulin 3.9 (H) 2.3 - 3.5 g/dL    A-G Ratio 0.8 (L) 1.2 - 3.5     POC G3    Collection Time: 02/11/20  2:28 PM   Result Value Ref Range    Device: NASAL CANNULA      pH (POC) 7.465 (H) 7.35 - 7.45      pCO2 (POC) 33.2 (L) 35 - 45 MMHG    pO2 (POC) 48 (L) 75 - 100 MMHG    HCO3 (POC) 23.9 22 - 26 MMOL/L    sO2 (POC) 86 (L) 95 - 98 %    Base excess (POC) 1 mmol/L    Allens test (POC) YES      Site RIGHT RADIAL      Specimen type (POC) ARTERIAL      Performed by [employer]     CO2, POC 25 MMOL/L    Flow rate (POC) 3.000 L/min    Critical value read back 14:28     Respiratory comment: PhysicianNotified     COLLECT TIME 1,426     URINE MICROSCOPIC    Collection Time: 02/11/20  3:15 PM   Result Value Ref Range    WBC 0-3 0 /hpf    RBC 0-3 0 /hpf    Epithelial cells 0-3 0 /hpf    Bacteria 0 0 /hpf    Casts 3-5 0 /lpf       IMAGING:     Xr Chest Pa Lat    Result Date: 2/11/2020  IMPRESSION: No radiographic evidence of acute cardiopulmonary disease. All Micro Results     None          EKG: personally reviewed and shows NSR, Q waves in inferior leads which were present on the old EKG also. CXR: Personally reviewed and shows no acute infiltrates or pulmonary edema. Assessment/Plan     Active Problems:    Acute exacerbation of chronic obstructive pulmonary disease (COPD) ([facility] [address] (2/11/2020)      1. Hypoxemia likely secondary to acute COPD exacerbation: Keep him on supplemental oxygen, slowly wean him off as tolerated. 2.  Possible chronic COPD with acute exacerbation. Start him on prednisone, duo nebs, guaifenesin, monitor. 3.  Fall, could be secondary to physical deconditioning. PT and OT evaluation. 4.  Paroxysmal A. fib: Keep him on amiodarone and metoprolol, warfarin for anticoagulation. Pharmacy to dose.     5.  Hypothyroidism: Continue levothyroxine, check TSH in a.m.    6.  History of prostate cancer. Continue finasteride and terazosin. 7.  CKD stage III/IV: Creatinine is elevated from baseline but the last creatinine was from 2017. There could be a component of acute kidney injury. We will give him gentle hydration and monitor creatinine and urine output. Follow-up UA and urine chemistries. All the pertinent investigative studies and treatment plan was discussed with the patient. Further recommendations as per the clinical course.     DVT prophylaxis: SCDs  Code status: Full  Risk: High risk patient secondary to advanced age, comorbidities    Felicia Preciado MD  February 11, 2020

## 2020-02-11 NOTE — ED PROVIDER NOTES
[facility] Emergency Department  Arrival Date/Time: No admission date for patient encounter. Lucero Lees  MRN: [id number]   YOB: 1940   79 y.o. male   D EMERGENCY DEPT Room/bed info not found  Seen on 2/11/2020 @ 2:14 PM     Today's Chief Complaint: Patient presents with:  Ge Blankenship Provider NOTE:  Arrived by EMS after fall at home outside. Could not get up due to generalized weakness. Denies injury. Denies recent illness  Has Hx of \"lung problems\" per brother. Denies HI, neck or back pain. Hx of falls with difficulty getting up. Positive cough, and wheezing on exam.    DDx: Generalized weakness; hypoxia; pneumonia          Azell Knock, DO; 2/11/2020 @2:14 PM============================    Lucero Lees is a 79 y.o. male seen on 2/11/2020 at 2:14 PM in the [facility] EMERGENCY DEPT   HPI:   [unfilled]    Review of Systems: [unfilled]    Past Medical History: Primary Care Doctor: Alessandro Funez MD  Meds, PMH, PSHx, SocHx at end of this note    Allergies: No Known Allergies   Key Anti-Platelet Anticoagulant Meds             warfarin (COUMADIN) 5 mg tablet Take 1 Tab by mouth daily. Physical Exam:  Nursing documentation reviewed. There were no vitals filed for this visit. Vital signs were reviewed. [facility]    MEDICAL DECISION MAKING:  Differential Diagnosis:   @MDM@     Data:     Lab findings during this visit: No results found for this or any previous visit (from the past 24 hour(s)).      Radiology studies during this visit: No orders to display     Medications given in the ED: Medications - No data to display    Procedure Documentation: [unfilled]    Assessment and Plan:     Other ED Course Notes:       Past Medical History:   Past Medical History:  No date: CAD (coronary artery disease)      Comment:  high cholesterol  No date: Cancer ([facility] [address]      Comment:  prostate  No date: Endocrine disease      Comment:  thyroid  Past Surgical History:  No date: HX PROSTATECTOMY  Social History    Tobacco Use      Smoking status: Former Smoker      Smokeless tobacco: Never Used    Alcohol use: No    Drug use: Not on file    Home Medication: This SmartLink can only be used in locations that support formatted text. Patient was going out to his mailbox going down his stairs and had a fall. Over the last year to year and a half he has had 4 or 5 similar falls none of which have a provoking cause. Other than bumping his knees he complains of no injury associated with the fall. Did not hit his head. There is no history of loss of consciousness. No focal motor or sensory changes. Family thinks that he has COPD but he does not officially carry that diagnosis. He has a cough that he states is chronic. Not wear oxygen at home. The history is provided by the patient, a relative and medical records. Fall   The accident occurred 1 to 2 hours ago. Fall occurred: Stairs. Impact surface: Hard surface. There was no blood loss. Point of impact: Sore to both knees. Pain location: Sore to both knees but able to move freely with no difficulty. He was ambulatory at the scene. There was no entrapment after the fall. There was no drug use involved in the accident. There was no alcohol use involved in the accident. Pertinent negatives include no visual change, no numbness, no abdominal pain, no bowel incontinence, no nausea, no vomiting, no loss of consciousness, no tingling and no laceration. The symptoms are aggravated by pressure on injury. He has tried nothing for the symptoms. Past Medical History:   Diagnosis Date    CAD (coronary artery disease)     high cholesterol    Cancer (HCC)     prostate    Endocrine disease     thyroid       Past Surgical History:   Procedure Laterality Date    HX PROSTATECTOMY           No family history on file.     Social History     Socioeconomic History    Marital status:      Spouse name: Not on file    Number of children: Not on file    Years of education: Not on file    Highest education level: Not on file   Occupational History    Not on file   Social Needs    Financial resource strain: Not on file    Food insecurity:     Worry: Not on file     Inability: Not on file    Transportation needs:     Medical: Not on file     Non-medical: Not on file   Tobacco Use    Smoking status: Former Smoker    Smokeless tobacco: Never Used   Substance and Sexual Activity    Alcohol use: No    Drug use: Not on file    Sexual activity: Not on file   Lifestyle    Physical activity:     Days per week: Not on file     Minutes per session: Not on file    Stress: Not on file   Relationships    Social connections:     Talks on phone: Not on file     Gets together: Not on file     Attends [employer] service: Not on file     Active member of club or organization: Not on file     Attends meetings of clubs or organizations: Not on file     Relationship status: Not on file    Intimate partner violence:     Fear of current or ex partner: Not on file     Emotionally abused: Not on file     Physically abused: Not on file     Forced sexual activity: Not on file   Other Topics Concern    Not on file   Social History Narrative    Not on file         ALLERGIES: Patient has no known allergies. Review of Systems   Respiratory: Positive for shortness of breath. Negative for wheezing. Cardiovascular: Negative for chest pain and palpitations. Gastrointestinal: Negative for abdominal pain, bowel incontinence, nausea and vomiting. Neurological: Negative for tingling, loss of consciousness and numbness. All other systems reviewed and are negative. There were no vitals filed for this visit. Physical Exam  Vitals signs and nursing note reviewed. Constitutional:       General: He is not in acute distress. Appearance: He is well-developed. HENT:      Head: Atraumatic. Eyes:      General: No scleral icterus.   Neck: Musculoskeletal: Neck supple. Cardiovascular:      Rate and Rhythm: Normal rate. Pulmonary:      Effort: Pulmonary effort is normal. No respiratory distress. Breath sounds: No rhonchi. Comments: Even with low sats is fairly relaxed  Chest:      Chest wall: No tenderness. Abdominal:      Palpations: Abdomen is soft. Comments: No tenderness   Musculoskeletal:      Right lower leg: No edema. Left lower leg: No edema. Skin:     General: Skin is warm and dry. Findings: No laceration. Psychiatric:         Thought Content: Thought content normal.          MDM  Number of Diagnoses or Management Options  Diagnosis management comments: Here with hypoxia on room air. Blood gas with Po2 in upper 40's is on ROOM AIR.  Patient does not have home oxygen       Amount and/or Complexity of Data Reviewed  Clinical lab tests: reviewed and ordered  Tests in the radiology section of CPT®: reviewed and ordered  Obtain history from someone other than the patient: yes  Independent visualization of images, tracings, or specimens: yes    Risk of Complications, Morbidity, and/or Mortality  Presenting problems: moderate  Diagnostic procedures: low  Management options: moderate    Patient Progress  Patient progress: stable         Procedures

## 2020-02-11 NOTE — ED NOTES
TRANSFER - OUT REPORT:    Verbal report given to [facility], [facility] on [address]  being transferred to [address] [place] [address] for routine progression of care       Report consisted of patients Situation, Background, Assessment and   Recommendations(SBAR). Information from the following report(s) SBAR was reviewed with the receiving nurse. Lines:   Peripheral IV 02/11/20 Left Antecubital (Active)        Opportunity for questions and clarification was provided.       Patient transported with:   O2 @ 3 liters  Tech

## 2020-02-11 NOTE — PROGRESS NOTES
ABG completed at 1428 was on RA. I charted 3L in error and will have the lab correct results. Dr. Katharina Ojeda notified.

## 2020-02-12 ENCOUNTER — HOME HEALTH ADMISSION (OUTPATIENT)
Dept: HOME HEALTH SERVICES | Facility: HOME HEALTH | Age: 80
End: 2020-02-12

## 2020-02-12 LAB
ANION GAP SERPL CALC-SCNC: 5 MMOL/L (ref 7–16)
ATRIAL RATE: 89 BPM
BASOPHILS # BLD: 0 K/UL (ref 0–0.2)
BASOPHILS NFR BLD: 1 % (ref 0–2)
BUN SERPL-MCNC: 26 MG/DL (ref 8–23)
CALCIUM SERPL-MCNC: 8.6 MG/DL (ref 8.3–10.4)
CALCULATED P AXIS, ECG09: 85 DEGREES
CALCULATED R AXIS, ECG10: 50 DEGREES
CALCULATED T AXIS, ECG11: 48 DEGREES
CHLORIDE SERPL-SCNC: 109 MMOL/L (ref 98–107)
CO2 SERPL-SCNC: 29 MMOL/L (ref 21–32)
CREAT SERPL-MCNC: 2.31 MG/DL (ref 0.8–1.5)
CREAT UR-MCNC: 174 MG/DL
DIAGNOSIS, 93000: NORMAL
DIFFERENTIAL METHOD BLD: ABNORMAL
EOSINOPHIL # BLD: 0 K/UL (ref 0–0.8)
EOSINOPHIL NFR BLD: 1 % (ref 0.5–7.8)
ERYTHROCYTE [DISTWIDTH] IN BLOOD BY AUTOMATED COUNT: 14.3 % (ref 11.9–14.6)
EST. AVERAGE GLUCOSE BLD GHB EST-MCNC: 114 MG/DL
GLUCOSE SERPL-MCNC: 117 MG/DL (ref 65–100)
HBA1C MFR BLD: 5.6 % (ref 4.8–6)
HCT VFR BLD AUTO: 40.1 % (ref 41.1–50.3)
HGB BLD-MCNC: 13 G/DL (ref 13.6–17.2)
IMM GRANULOCYTES # BLD AUTO: 0.1 K/UL (ref 0–0.5)
IMM GRANULOCYTES NFR BLD AUTO: 1 % (ref 0–5)
INR PPP: 2.6
LYMPHOCYTES # BLD: 0.8 K/UL (ref 0.5–4.6)
LYMPHOCYTES NFR BLD: 14 % (ref 13–44)
MAGNESIUM SERPL-MCNC: 2.2 MG/DL (ref 1.8–2.4)
MCH RBC QN AUTO: 29.1 PG (ref 26.1–32.9)
MCHC RBC AUTO-ENTMCNC: 32.4 G/DL (ref 31.4–35)
MCV RBC AUTO: 89.7 FL (ref 79.6–97.8)
MONOCYTES # BLD: 0.7 K/UL (ref 0.1–1.3)
MONOCYTES NFR BLD: 12 % (ref 4–12)
NEUTS SEG # BLD: 4.2 K/UL (ref 1.7–8.2)
NEUTS SEG NFR BLD: 72 % (ref 43–78)
NRBC # BLD: 0 K/UL (ref 0–0.2)
P-R INTERVAL, ECG05: 208 MS
PHOSPHATE SERPL-MCNC: 2.3 MG/DL (ref 2.3–3.7)
PLATELET # BLD AUTO: 149 K/UL (ref 150–450)
PMV BLD AUTO: 9.8 FL (ref 9.4–12.3)
POTASSIUM SERPL-SCNC: 3.2 MMOL/L (ref 3.5–5.1)
PROTHROMBIN TIME: 28.7 SEC (ref 12–14.7)
Q-T INTERVAL, ECG07: 374 MS
QRS DURATION, ECG06: 94 MS
QTC CALCULATION (BEZET), ECG08: 455 MS
RBC # BLD AUTO: 4.47 M/UL (ref 4.23–5.6)
SODIUM SERPL-SCNC: 143 MMOL/L (ref 136–145)
SODIUM UR-SCNC: 122 MMOL/L
TSH SERPL DL<=0.005 MIU/L-ACNC: 0.53 UIU/ML (ref 0.36–3.74)
VENTRICULAR RATE, ECG03: 89 BPM
VIT B12 SERPL-MCNC: 636 PG/ML (ref 193–986)
WBC # BLD AUTO: 5.8 K/UL (ref 4.3–11.1)

## 2020-02-12 PROCEDURE — 77010033678 HC OXYGEN DAILY

## 2020-02-12 PROCEDURE — 84100 ASSAY OF PHOSPHORUS: CPT

## 2020-02-12 PROCEDURE — 36415 COLL VENOUS BLD VENIPUNCTURE: CPT

## 2020-02-12 PROCEDURE — 94760 N-INVAS EAR/PLS OXIMETRY 1: CPT

## 2020-02-12 PROCEDURE — 84443 ASSAY THYROID STIM HORMONE: CPT

## 2020-02-12 PROCEDURE — 82570 ASSAY OF URINE CREATININE: CPT

## 2020-02-12 PROCEDURE — 74011250637 HC RX REV CODE- 250/637: Performed by: HOSPITALIST

## 2020-02-12 PROCEDURE — 83036 HEMOGLOBIN GLYCOSYLATED A1C: CPT

## 2020-02-12 PROCEDURE — 74011250636 HC RX REV CODE- 250/636: Performed by: HOSPITALIST

## 2020-02-12 PROCEDURE — 83735 ASSAY OF MAGNESIUM: CPT

## 2020-02-12 PROCEDURE — 82607 VITAMIN B-12: CPT

## 2020-02-12 PROCEDURE — A9270 NON-COVERED ITEM OR SERVICE: HCPCS | Performed by: HOSPITALIST

## 2020-02-12 PROCEDURE — 84300 ASSAY OF URINE SODIUM: CPT

## 2020-02-12 PROCEDURE — 74011000250 HC RX REV CODE- 250: Performed by: HOSPITALIST

## 2020-02-12 PROCEDURE — 99218 HC RM OBSERVATION: CPT

## 2020-02-12 PROCEDURE — 94640 AIRWAY INHALATION TREATMENT: CPT

## 2020-02-12 PROCEDURE — 74011636637 HC RX REV CODE- 636/637: Performed by: HOSPITALIST

## 2020-02-12 PROCEDURE — 85610 PROTHROMBIN TIME: CPT

## 2020-02-12 PROCEDURE — 97161 PT EVAL LOW COMPLEX 20 MIN: CPT

## 2020-02-12 PROCEDURE — 85025 COMPLETE CBC W/AUTO DIFF WBC: CPT

## 2020-02-12 PROCEDURE — 80048 BASIC METABOLIC PNL TOTAL CA: CPT

## 2020-02-12 PROCEDURE — 97110 THERAPEUTIC EXERCISES: CPT

## 2020-02-12 RX ORDER — POTASSIUM CHLORIDE 20 MEQ/1
40 TABLET, EXTENDED RELEASE ORAL 2 TIMES DAILY
Status: DISCONTINUED | OUTPATIENT
Start: 2020-02-12 | End: 2020-02-13

## 2020-02-12 RX ADMIN — AMIODARONE HYDROCHLORIDE 200 MG: 200 TABLET ORAL at 09:05

## 2020-02-12 RX ADMIN — SODIUM CHLORIDE 50 ML/HR: 900 INJECTION, SOLUTION INTRAVENOUS at 23:14

## 2020-02-12 RX ADMIN — FINASTERIDE 5 MG: 5 TABLET, FILM COATED ORAL at 09:05

## 2020-02-12 RX ADMIN — GUAIFENESIN 600 MG: 600 TABLET ORAL at 09:05

## 2020-02-12 RX ADMIN — PREDNISONE 40 MG: 20 TABLET ORAL at 09:05

## 2020-02-12 RX ADMIN — IPRATROPIUM BROMIDE AND ALBUTEROL SULFATE 3 ML: .5; 3 SOLUTION RESPIRATORY (INHALATION) at 14:08

## 2020-02-12 RX ADMIN — ATORVASTATIN CALCIUM 20 MG: 10 TABLET, FILM COATED ORAL at 21:27

## 2020-02-12 RX ADMIN — WARFARIN SODIUM 5 MG: 5 TABLET ORAL at 17:01

## 2020-02-12 RX ADMIN — LEVOTHYROXINE SODIUM 50 MCG: 0.05 TABLET ORAL at 05:29

## 2020-02-12 RX ADMIN — METOPROLOL TARTRATE 25 MG: 25 TABLET ORAL at 17:01

## 2020-02-12 RX ADMIN — Medication 10 ML: at 14:00

## 2020-02-12 RX ADMIN — IPRATROPIUM BROMIDE AND ALBUTEROL SULFATE 3 ML: .5; 3 SOLUTION RESPIRATORY (INHALATION) at 01:33

## 2020-02-12 RX ADMIN — TERAZOSIN HYDROCHLORIDE 10 MG: 5 CAPSULE ORAL at 21:27

## 2020-02-12 RX ADMIN — IPRATROPIUM BROMIDE AND ALBUTEROL SULFATE 3 ML: .5; 3 SOLUTION RESPIRATORY (INHALATION) at 19:13

## 2020-02-12 RX ADMIN — POTASSIUM CHLORIDE 40 MEQ: 20 TABLET, EXTENDED RELEASE ORAL at 09:05

## 2020-02-12 RX ADMIN — GUAIFENESIN 600 MG: 600 TABLET ORAL at 21:27

## 2020-02-12 RX ADMIN — Medication 10 ML: at 21:27

## 2020-02-12 RX ADMIN — METOPROLOL TARTRATE 25 MG: 25 TABLET ORAL at 09:05

## 2020-02-12 RX ADMIN — POTASSIUM CHLORIDE 40 MEQ: 20 TABLET, EXTENDED RELEASE ORAL at 17:01

## 2020-02-12 RX ADMIN — IPRATROPIUM BROMIDE AND ALBUTEROL SULFATE 3 ML: .5; 3 SOLUTION RESPIRATORY (INHALATION) at 08:52

## 2020-02-12 NOTE — PROGRESS NOTES
Care Management Interventions  PCP Verified by CM: Yes(Dr. Dodie Kathleen)  Mode of Transport at Discharge: Self  Transition of Care Consult (CM Consult): Discharge Planning  Discharge Durable Medical Equipment: Yes(Walker)  Physical Therapy Consult: Yes  Occupational Therapy Consult: No  Speech Therapy Consult: No  Current Support Network: Own Home, Lives Alone  Confirm Follow Up Transport: Self  The Plan for Transition of Care is Related to the Following Treatment Goals : strength and gait training  The Patient and/or Patient Representative was Provided with a Choice of Provider and Agrees with the Discharge Plan?: Yes  Name of the Patient Representative Who was Provided with a Choice of Provider and Agrees with the Discharge Plan: self  Freedom of Choice List was Provided with Basic Dialogue that Supports the Patient's Individualized Plan of Care/Goals, Treatment Preferences and Shares the Quality Data Associated with the Providers?: Yes  Discharge Location  Discharge Placement: Home with home health    CM met with patient in room. Patient alert and orient. Patient stated he lives alone in a mobile home that has 6 steps to enter the residence. Patient stated he has a brother whom he sees often. DME: Walker  O2: n/a  CPAP: n/a  HD: n/a  ADL: patient stated he is independent at  Baseline. Patient stated he drives often and is able to obtain and afford his medications in the community. Patient stated he has never used [facility] services but stated he would be open to it. CM provided patient with a list of [facility] facilities. Patient stated he would use [facility] as he does not know about the other agencies. CM will send referral to [facility]. CM will continue to follow patient during hospitalization for discharge planning and needs. Please consult CM for new needs.

## 2020-02-12 NOTE — PROGRESS NOTES
Progress Note      Patient: Devendra Obando               Sex: male          MRN: [id number]           YOB: 1940      Age:  79 y.o. PCP:  Bhumika Rodriguez MD  Treatment Team: Attending Provider: Kendall Mueller MD; Utilization Review: Tameka Bojorquez RN; Physical Therapist: Kina Harris DPT; Care Manager: Edgar Craig RN  Subjective: This is a 59-year-old gentleman with history of hypertension, hypothyroidism, history of prostate cancer, A. fib on anticoagulation with Coumadin, CKD stage III/IV, possible COPD admitted to the hospital with COPD exacerbation and hypoxemia. 2020: Still having some cough but seems to be improving. Complains of shortness of breath with exertion, better with rest.  No fevers overnight. No diarrhea or nausea or vomiting. Objective:   Physical Exam:   Visit Vitals  /83 (BP 1 Location: Left arm, BP Patient Position: At rest)   Pulse 96   Temp 98.4 °F (36.9 °C)   Resp 17   Ht 5' 6\" (1.676 m)   Wt 80.3 kg (177 lb)   SpO2 97%   BMI 28.57 kg/m²      Temp (24hrs), Av.1 °F (36.7 °C), Min:97.5 °F (36.4 °C), Max:98.6 °F (37 °C)    Oxygen Therapy  O2 Sat (%): 97 % (20 1408)  Pulse via Oximetry: 88 beats per minute (20 1408)  O2 Device: Nasal cannula (20 1408)  O2 Flow Rate (L/min): 2 l/min (20 1408)    Intake/Output Summary (Last 24 hours) at 2020 1538  Last data filed at 2020 0810  Gross per 24 hour   Intake 1360 ml   Output 300 ml   Net 1060 ml      General: Conscious, No acute distress  Eyes:  OLESYA, No pallor/icterus    HENT:             Oral Mucosa is Moist, No sinus tenderness  Neck:               Supple, No JVD  Lungs:  Improving bilateral air entry with mild expiratory wheezing. Heart:  S1 S2 regular  Abdomen: Soft, normal bowel sounds, NTND, No guarding/rigidity/rebound tend.   Extremities: No pedal edema  Neurologic:  AAOX3, No acute FND  Skin:                No acute rashes  Musculoskeletal: No Acute findings  Psych:             Appropriate mood, Thought process seems to be normal.    LAB  Recent Results (from the past 24 hour(s))   CK    Collection Time: 02/11/20  7:26 PM   Result Value Ref Range     21 - 215 U/L   CBC WITH AUTOMATED DIFF    Collection Time: 02/12/20  6:29 AM   Result Value Ref Range    WBC 5.8 4.3 - 11.1 K/uL    RBC 4.47 4.23 - 5.6 M/uL    HGB 13.0 (L) 13.6 - 17.2 g/dL    HCT 40.1 (L) 41.1 - 50.3 %    MCV 89.7 79.6 - 97.8 FL    MCH 29.1 26.1 - 32.9 PG    MCHC 32.4 31.4 - 35.0 g/dL    RDW 14.3 11.9 - 14.6 %    PLATELET 213 (L) 871 - 450 K/uL    MPV 9.8 9.4 - 12.3 FL    ABSOLUTE NRBC 0.00 0.0 - 0.2 K/uL    DF AUTOMATED      NEUTROPHILS 72 43 - 78 %    LYMPHOCYTES 14 13 - 44 %    MONOCYTES 12 4.0 - 12.0 %    EOSINOPHILS 1 0.5 - 7.8 %    BASOPHILS 1 0.0 - 2.0 %    IMMATURE GRANULOCYTES 1 0.0 - 5.0 %    ABS. NEUTROPHILS 4.2 1.7 - 8.2 K/UL    ABS. LYMPHOCYTES 0.8 0.5 - 4.6 K/UL    ABS. MONOCYTES 0.7 0.1 - 1.3 K/UL    ABS. EOSINOPHILS 0.0 0.0 - 0.8 K/UL    ABS. BASOPHILS 0.0 0.0 - 0.2 K/UL    ABS. IMM.  GRANS. 0.1 0.0 - 0.5 K/UL   METABOLIC PANEL, BASIC    Collection Time: 02/12/20  6:29 AM   Result Value Ref Range    Sodium 143 136 - 145 mmol/L    Potassium 3.2 (L) 3.5 - 5.1 mmol/L    Chloride 109 (H) 98 - 107 mmol/L    CO2 29 21 - 32 mmol/L    Anion gap 5 (L) 7 - 16 mmol/L    Glucose 117 (H) 65 - 100 mg/dL    BUN 26 (H) 8 - 23 MG/DL    Creatinine 2.31 (H) 0.8 - 1.5 MG/DL    GFR est AA 35 (L) >60 ml/min/1.73m2    GFR est non-AA 29 (L) >60 ml/min/1.73m2    Calcium 8.6 8.3 - 10.4 MG/DL   VITAMIN B12    Collection Time: 02/12/20  6:29 AM   Result Value Ref Range    Vitamin B12 636 193 - 986 pg/mL   PROTHROMBIN TIME + INR    Collection Time: 02/12/20  6:29 AM   Result Value Ref Range    Prothrombin time 28.7 (H) 12.0 - 14.7 sec    INR 2.6     TSH 3RD GENERATION    Collection Time: 02/12/20  6:29 AM   Result Value Ref Range    TSH 0.531 0.358 - 3.740 uIU/mL HEMOGLOBIN A1C WITH EAG    Collection Time: 02/12/20  6:29 AM   Result Value Ref Range    Hemoglobin A1c 5.6 4.8 - 6.0 %    Est. average glucose 114 mg/dL   MAGNESIUM    Collection Time: 02/12/20  6:29 AM   Result Value Ref Range    Magnesium 2.2 1.8 - 2.4 mg/dL   PHOSPHORUS    Collection Time: 02/12/20  6:29 AM   Result Value Ref Range    Phosphorus 2.3 2.3 - 3.7 MG/DL   CREATININE, UR, RANDOM    Collection Time: 02/12/20  9:27 AM   Result Value Ref Range    Creatinine, urine 174.00 mg/dL   SODIUM, UR, RANDOM    Collection Time: 02/12/20  9:27 AM   Result Value Ref Range    Sodium,urine random 122 MMOL/L       No results found. No results found. All Micro Results     None          Current Medications Reviewed      Assessment/Plan     Principal Problem:    Acute exacerbation of chronic obstructive pulmonary disease (COPD) ([facility] [address] (2/11/2020)      1. Hypoxemia secondary to acute COPD exacerbation: Keep him on supplemental oxygen, slowly wean him off as tolerated.     2.  Possible chronic COPD with acute exacerbation. Continue prednisone, duo nebs, guaifenesin, monitor.      3. Fall, could be secondary to physical deconditioning. PT and OT evaluation. B12 is normal.     4.  Paroxysmal A. fib: Keep him on amiodarone and metoprolol, warfarin for anticoagulation. Pharmacy to dose.     5. Hypothyroidism: Continue levothyroxine, TSH is normal.     6.  History of prostate cancer. Continue finasteride and terazosin.     7. CKD stage III/IV: Creatinine is elevated from baseline but the last creatinine was from 2017. There could be a component of acute kidney injury. We will give him gentle hydration and monitor creatinine and urine output. UA looks okay.     DVT prophylaxis: SCDs  Code status: Full  Risk:  Moderate risk    Jenn Valdivia MD  February 12, 2020

## 2020-02-12 NOTE — PROGRESS NOTES
02/11/20 1830   Dual Skin Pressure Injury Assessment   Dual Skin Pressure Injury Assessment WDL   Second Care Provider (Based on Facility Policy) Mami Patrick RN   Skin Integumentary   Skin Integumentary (WDL) X   Skin Color Appropriate for ethnicity; Ecchymosis (comment)  (BLE)   Skin Condition/Temp Dry; Warm   Skin Integrity Abrasion   Turgor Epidermis thin w/ loss of subcut tissue   Hair Growth Sparce   Varicosities Absent   Mami Patrick RN

## 2020-02-12 NOTE — PROGRESS NOTES
[address]  Face to Face Encounter    Patients Name: Iban Holden    YOB: 1940    Ordering Physician: Dr. Jacek Pettit    Primary Diagnosis: Acute exacerb[facility] of chronic [facility] pulmonary disease [facility]) ([facility] [J44.1]    Date of Face to Face:   2/12/2020                                  Face to Face Encounter findings are related to primary reason for home care:   yes. 1. I certify that the patient needs intermittent care as follows: skilled nursing care:  skilled observation/assessment, patient education  physical therapy: strengthening and gait/stair training    2. I certify that this patient is homebound, that is: 1) patient requires the use of a walker device, special transportation, or assistance of another to leave the home; or 2) patient's condition makes leaving the home medically contraindicated; and 3) patient has a normal inability to leave the home and leaving the home requires considerable and taxing effort. Patient may leave the home for infrequent and short duration for medical reasons, and occasional absences for non-medical reasons. Homebound status is due to the following functional limitations: Patient with strength deficits limiting the performance of all ADL's without caregiver assistance or the use of an assistive device. 3. I certify that this patient is under my care and that I, or a nurse practitioner or  [id number], or clinical nurse specialist, or certified nurse midwife, working with me, had a Face-to-Face Encounter that meets the physician Face-to-Face Encounter requirements.   The following are the clinical findings from the [address] [place] [address] encounter that support the need for skilled services and is a summary of the encounter: Medical Record    See discharge summary      Natalie Dallas RN  2/12/2020      THE FOLLOWING TO BE COMPLETED BY THE COMMUNITY PHYSICIAN:    I concur with the findings described above from the [facility] encounter that this patient is homebound and in need of a skilled service.     Certifying Physician: _____________________________________      Printed Certifying Physician Name: _____________________________________    Date: _________________

## 2020-02-12 NOTE — PROGRESS NOTES
Problem: Mobility Impaired (Adult and Pediatric)  Goal: *Acute Goals and Plan of Care (Insert Text)  Description  LTG:  (1.)Mr. Cristóbal Stuart will move from supine to sit and sit to supine, scoot up and down and roll side to side INDEPENDENTLY with bed flat within 7 treatment day(s). (2.)Mr. Cristóbal Stuart will transfer from bed to chair and chair to bed with MODIFIED INDEPENDENCE using the least restrictive device within 7 treatment day(s). (3.)Mr. Cristóbal Stuart will ambulate with SUPERVISION for 300+ feet with the least restrictive device within 7 treatment day(s). (4.)Mr. Cristóbal Stuart will perform exercises per HEP independently to improve strength and mobility within 7 days. ________________________________________________________________________________________________   2/12/2020 0956 by Sharifa Bee DPT  Outcome: Progressing Towards Goal    PHYSICAL THERAPY: Initial Assessment and AM 2/12/2020  OBSERVATION: PT Visit Days : 1  [place] / [place] / Product Type: Medicare /       NAME/AGE/GENDER: Cherie Whelan is a 79 y.o. male   PRIMARY DIAGNOSIS: Acute exacerbation of chronic obstructive pulmonary disease (COPD) ([facility] [J44.1] Acute exacerbation of chronic obstructive pulmonary disease (COPD) ([facility] Acute exacerbation of chronic obstructive pulmonary disease (COPD) ([facility]        ICD-10: Treatment Diagnosis:    Generalized Muscle Weakness (M62.81)  Difficulty in walking, Not elsewhere classified (R26.2)  Other abnormalities of gait and mobility (R26.89)   Precaution/Allergies:  Patient has no known allergies. ASSESSMENT:     Mr. Cristóbal Stuart is an 79year old male admitted from home for COPD exacerbation, fall. At baseline he lives alone and is independent with mobility, ambulation, and ADLs. He presents sitting up in bed without complaints, agreeable to therapy evaluation and mobility. Transfers to sitting with supervision.  Worked on seated static and dynamic activities and functional tasks in sitting to improve strength and activity tolerance. CGA/SBA for sit-stand transfers. Takes a few steps in room without assistive device with unsteady gait and appears very fearful of falling. Provided with walker which greatly improves mobility and balance. Ambulates for an additional 250 ft in hallway with CGA-brief min assist and with verbal cues for posture, gait speed, and gait safety. Fluctuating pace and increased lateral trunk sway noted. Returned to room and up to chair after activity with needs in reach. Kade Villalba is functioning slightly below baseline with mobility, balance, and activity tolerance and will benefit from continued therapy during hospital stay to maximize safety/independence with mobility. Anticipate return home with [facility] [place]. This section established at most recent assessment   PROBLEM LIST (Impairments causing functional limitations):  Decreased Strength  Decreased Transfer Abilities  Decreased Ambulation Ability/Technique  Decreased Balance  Decreased Activity Tolerance  Increased Shortness of Breath   INTERVENTIONS PLANNED: (Benefits and precautions of physical therapy have been discussed with the patient.)  Balance Exercise  Bed Mobility  Gait Training  Home Exercise Program (HEP)  Therapeutic Activites  Therapeutic Exercise/Strengthening  Transfer Training     TREATMENT PLAN: Frequency/Duration: 3 times a week for duration of hospital stay  Rehabilitation Potential For Stated Goals: Good     REHAB RECOMMENDATIONS (at time of discharge pending progress):    Placement: It is my opinion, based on this patient's performance to date, that Mr. Erica Bello may benefit from [address] after discharge due to the functional deficits listed above that are likely to improve with skilled rehabilitation because he/she has multiple medical issues that affect his/her functional mobility in the community.   Equipment:   None at this time HISTORY:   History of Present Injury/Illness (Reason for Referral):  Per H&P, \"This is a 34-year-old gentleman with history of hypertension, hypothyroidism, history of prostate cancer, A. fib on anticoagulation with Coumadin, CKD stage III/IV, possible COPD, was brought into the emergency room after he fell today. Patient states he lost his balance and fell to the ground and could not get up. Did not had any shortness of breath or dizziness or chest pain before the fall. No head trauma and no loss of consciousness. Patient denies any severe injury and is not complaining of any pain in any of the joints or hip. When he was initially evaluated in the emergency room, he was found to be hypoxemic, placed on supplemental oxygen and hospitalist service was consulted. Patient was complaining of cough which is nonproductive, started 5 days ago, progressively getting worse, worse with exertion, somewhat better with rest.  No fever or chills. No chest pain or abdominal pain or nausea or vomiting. No diarrhea. No burning urination. No blood in the urine. No recent travel or sick contacts\"    Past Medical History/Comorbidities:   Mr. Hedwig Peabody  has a past medical history of CAD (coronary artery disease), Cancer ([address] and Endocrine disease. Mr. Hedwig Peabody  has a past surgical history that includes hx prostatectomy. Social History/Living Environment:   Home Environment: Trailer/mobile home  # Steps to Enter: 6  Rails to Enter: Yes  Wheelchair Ramp: No  One/Two Story Residence: One story  Living Alone: Yes  Support Systems: Family member(s)  Patient Expects to be Discharged to[de-identified] Trailer/mobile home  Current DME Used/Available at Home: [address], [place], straight, Walker, rolling  Prior Level of Function/Work/Activity:  Lives alone. Independent baseline, has cane and walker but does not frequently use. Reports a few recent falls when walking outside of home. Brother lives near by and can assist if needed.  Independent with ADLs. Drives. Number of Personal Factors/Comorbidities that affect the Plan of Care: 1-2: MODERATE COMPLEXITY   EXAMINATION:   Most Recent Physical Functioning:   Gross Assessment:  AROM: Within functional limits  Strength: Generally decreased, functional  Coordination: Within functional limits  Sensation: Intact               Posture:  Posture (WDL): Exceptions to WDL  Posture Assessment: Forward head, Rounded shoulders  Balance:  Sitting: Intact  Standing: Impaired  Standing - Static: Fair  Standing - Dynamic : Fair Bed Mobility:  Rolling: Supervision  Supine to Sit: Supervision  Scooting: Supervision  Wheelchair Mobility:     Transfers:  Sit to Stand: Contact guard assistance  Stand to Sit: Contact guard assistance  Bed to Chair: Contact guard assistance  Gait:     Base of Support: Widened  Speed/Kellee: Pace decreased (<100 feet/min); Slow  Step Length: Left shortened;Right shortened  Gait Abnormalities: Trunk sway increased;Decreased step clearance; Path deviations  Distance (ft): 250 Feet (ft)  Assistive Device: Walker, rolling  Ambulation - Level of Assistance: Contact guard assistance  Interventions: Verbal cues; Safety awareness training; Tactile cues      Body Structures Involved:  Lungs  Joints  Muscles Body Functions Affected:  Respiratory  Movement Related Activities and Participation Affected:  General Tasks and Demands  Mobility  Domestic Life  Community, Social and [address] [place] [address]   Number of elements that affect the Plan of Care: 4+: HIGH COMPLEXITY   CLINICAL PRESENTATION:   Presentation: Stable and uncomplicated: LOW COMPLEXITY   CLINICAL DECISION MAKIN [facility] Mobility Inpatient Short Form  How much difficulty does the patient currently have. .. Unable A Lot A Little None   1. Turning over in bed (including adjusting bedclothes, sheets and blankets)? [] 1   [] 2   [] 3   [x] 4   2.   Sitting down on and standing up from a chair with arms ( e.g., wheelchair, bedside commode, etc.)   [] 1   [] 2   [] 3   [x] 4   3. Moving from lying on back to sitting on the side of the bed? [] 1   [] 2   [] 3   [x] 4   How much help from another person does the patient currently need. .. Total A Lot A Little None   4. Moving to and from a bed to a chair (including a wheelchair)? [] 1   [] 2   [x] 3   [] 4   5. Need to walk in hospital room? [] 1   [] 2   [x] 3   [] 4   6. Climbing 3-5 steps with a railing? [] 1   [] 2   [x] 3   [] 4   © 2007, Trustees of [address] [place] [address] [address] [address], under license to [employer]. All rights reserved      Score:  Initial: 21 Most Recent: X (Date: -- )    Interpretation of Tool:  Represents activities that are increasingly more difficult (i.e. Bed mobility, Transfers, Gait). Medical Necessity:     Patient demonstrates   good   rehab potential due to higher previous functional level. Reason for Services/Other Comments:  Patient   continues to demonstrate capacity to improve mobility, balance, and activity tolerance which will   increase independence and increase safety  . Use of outcome tool(s) and clinical judgement create a POC that gives a: Clear prediction of patient's progress: LOW COMPLEXITY            TREATMENT:   (In addition to Assessment/Re-Assessment sessions the following treatments were rendered)   Pre-treatment Symptoms/Complaints:  \"thank you\"  Pain: Initial:   Pain Intensity 1: 0  Post Session:  0/10     Therapeutic Exercise: (8 Minutes):  mobility, seated static/dynamic functional activities, transfers, mobility in room and hallway to improve mobility, strength, balance, and activity tolerance . Required minimal visual, verbal, and tactile cues to promote proper body posture and promote proper body mechanics. Progressed gait distance, activity duration  as indicated.       Braces/Orthotics/Lines/Etc:   IV  O2 Device: Nasal cannula  Treatment/Session Assessment:    Response to Treatment:  pt performs mobility with CGA-brief min A using walker in room and hallway  Interdisciplinary Collaboration:   Physical Therapist  Registered Nurse  After treatment position/precautions:   Up in chair  Bed/Chair-wheels locked  Bed in low position  Call light within reach   Compliance with Program/Exercises: Will assess as treatment progresses  Recommendations/Intent for next treatment session: \"Next visit will focus on advancements to more challenging activities and reduction in assistance provided\".   Total Treatment Duration:  PT Patient Time In/Time Out  Time In: 0858  Time Out: 111 [address], [facility]

## 2020-02-12 NOTE — PROGRESS NOTES
Warfarin dosing per pharmacist    Negrito Padilla is a 79 y.o. male. Height: 5' 6\" (167.6 cm)    Weight: 80.3 kg (177 lb)    Indication:  Afib    Goal INR:  2 to 3    Home dose:  5 mg daily    Risk factors or significant drug interactions:  Amiodarone (already on both prior to admission)    Other anticoagulants:  N/A    Daily Monitoring  Date  INR     Warfarin dose HGB              Notes  2/11  2.5  5 mg?  13.9  2/12  2.6  5 mg  13.0        Pharmacy consulted to assist dosing warfarin in patient with a history of AFib who presented with a therapeutic INR of 2.5. Most recent [facility] note in our system is from October 2017 at which time it was noted that patient would be followed for warfarin management by the [place]. Unable to review chart from the [place] system. Patient reports taking warfarin 5 mg daily and given that INR therapeutic and remaining stable so far, will continue that dose for now. Continue daily INR for now.     Thank you,  Tony Babcock, PharmD, [address]  Clinical Pharmacist  [phone]

## 2020-02-12 NOTE — ROUTINE PROCESS
Hourly rounds completed during this shift. Bed low/locked, call light and personal belongings within reach. All needs met at this time. Will continue to monitor and give report to oncoming RN.

## 2020-02-13 ENCOUNTER — APPOINTMENT (OUTPATIENT)
Dept: GENERAL RADIOLOGY | Age: 80
DRG: 191 | End: 2020-02-13
Attending: HOSPITALIST
Payer: MEDICARE

## 2020-02-13 VITALS
RESPIRATION RATE: 18 BRPM | OXYGEN SATURATION: 95 % | DIASTOLIC BLOOD PRESSURE: 68 MMHG | TEMPERATURE: 98.6 F | WEIGHT: 177 LBS | SYSTOLIC BLOOD PRESSURE: 143 MMHG | HEIGHT: 66 IN | BODY MASS INDEX: 28.45 KG/M2 | HEART RATE: 91 BPM

## 2020-02-13 LAB
ANION GAP SERPL CALC-SCNC: 10 MMOL/L (ref 7–16)
BUN SERPL-MCNC: 20 MG/DL (ref 8–23)
CALCIUM SERPL-MCNC: 8.4 MG/DL (ref 8.3–10.4)
CHLORIDE SERPL-SCNC: 110 MMOL/L (ref 98–107)
CO2 SERPL-SCNC: 24 MMOL/L (ref 21–32)
CREAT SERPL-MCNC: 2.06 MG/DL (ref 0.8–1.5)
GLUCOSE SERPL-MCNC: 164 MG/DL (ref 65–100)
INR PPP: 2.9
MAGNESIUM SERPL-MCNC: 1.9 MG/DL (ref 1.8–2.4)
PHOSPHATE SERPL-MCNC: 1.6 MG/DL (ref 2.3–3.7)
POTASSIUM SERPL-SCNC: 4.2 MMOL/L (ref 3.5–5.1)
PROTHROMBIN TIME: 30.8 SEC (ref 12–14.7)
SODIUM SERPL-SCNC: 144 MMOL/L (ref 136–145)

## 2020-02-13 PROCEDURE — 94640 AIRWAY INHALATION TREATMENT: CPT

## 2020-02-13 PROCEDURE — 65270000029 HC RM PRIVATE

## 2020-02-13 PROCEDURE — 99218 HC RM OBSERVATION: CPT

## 2020-02-13 PROCEDURE — 94760 N-INVAS EAR/PLS OXIMETRY 1: CPT

## 2020-02-13 PROCEDURE — 80048 BASIC METABOLIC PNL TOTAL CA: CPT

## 2020-02-13 PROCEDURE — 74011636637 HC RX REV CODE- 636/637: Performed by: HOSPITALIST

## 2020-02-13 PROCEDURE — 71045 X-RAY EXAM CHEST 1 VIEW: CPT

## 2020-02-13 PROCEDURE — 85610 PROTHROMBIN TIME: CPT

## 2020-02-13 PROCEDURE — 74011000250 HC RX REV CODE- 250: Performed by: HOSPITALIST

## 2020-02-13 PROCEDURE — 74011250637 HC RX REV CODE- 250/637: Performed by: HOSPITALIST

## 2020-02-13 PROCEDURE — 36415 COLL VENOUS BLD VENIPUNCTURE: CPT

## 2020-02-13 PROCEDURE — 83735 ASSAY OF MAGNESIUM: CPT

## 2020-02-13 PROCEDURE — 94761 N-INVAS EAR/PLS OXIMETRY MLT: CPT

## 2020-02-13 PROCEDURE — A9270 NON-COVERED ITEM OR SERVICE: HCPCS | Performed by: HOSPITALIST

## 2020-02-13 PROCEDURE — 84100 ASSAY OF PHOSPHORUS: CPT

## 2020-02-13 RX ORDER — IPRATROPIUM BROMIDE AND ALBUTEROL SULFATE 2.5; .5 MG/3ML; MG/3ML
3 SOLUTION RESPIRATORY (INHALATION)
Qty: 60 NEBULE | Refills: 0 | Status: SHIPPED | OUTPATIENT
Start: 2020-02-13

## 2020-02-13 RX ORDER — POLYETHYLENE GLYCOL 3350 17 G/17G
17 POWDER, FOR SOLUTION ORAL
Qty: 10 PACKET | Refills: 1 | Status: SHIPPED | OUTPATIENT
Start: 2020-02-13 | End: 2020-02-23

## 2020-02-13 RX ORDER — ATORVASTATIN CALCIUM 20 MG/1
20 TABLET, FILM COATED ORAL
Qty: 30 TAB | Refills: 0 | Status: SHIPPED | OUTPATIENT
Start: 2020-02-13 | End: 2020-03-14

## 2020-02-13 RX ORDER — ALBUTEROL SULFATE 90 UG/1
2 AEROSOL, METERED RESPIRATORY (INHALATION)
Qty: 1 INHALER | Refills: 1 | Status: SHIPPED | OUTPATIENT
Start: 2020-02-13

## 2020-02-13 RX ORDER — AMIODARONE HYDROCHLORIDE 200 MG/1
200 TABLET ORAL DAILY
Qty: 30 TAB | Refills: 0 | Status: SHIPPED | OUTPATIENT
Start: 2020-02-14 | End: 2020-03-15

## 2020-02-13 RX ORDER — METHYLPREDNISOLONE 4 MG/1
TABLET ORAL
Qty: 1 DOSE PACK | Refills: 0 | Status: SHIPPED | OUTPATIENT
Start: 2020-02-13

## 2020-02-13 RX ORDER — GUAIFENESIN 600 MG/1
600 TABLET, EXTENDED RELEASE ORAL
Qty: 10 TAB | Refills: 0 | Status: SHIPPED | OUTPATIENT
Start: 2020-02-13 | End: 2020-02-18

## 2020-02-13 RX ADMIN — METOPROLOL TARTRATE 25 MG: 25 TABLET ORAL at 08:31

## 2020-02-13 RX ADMIN — FINASTERIDE 5 MG: 5 TABLET, FILM COATED ORAL at 08:31

## 2020-02-13 RX ADMIN — Medication 10 ML: at 06:09

## 2020-02-13 RX ADMIN — GUAIFENESIN 600 MG: 600 TABLET ORAL at 08:31

## 2020-02-13 RX ADMIN — PREDNISONE 40 MG: 20 TABLET ORAL at 08:31

## 2020-02-13 RX ADMIN — IPRATROPIUM BROMIDE AND ALBUTEROL SULFATE 3 ML: .5; 3 SOLUTION RESPIRATORY (INHALATION) at 01:22

## 2020-02-13 RX ADMIN — AMIODARONE HYDROCHLORIDE 200 MG: 200 TABLET ORAL at 08:30

## 2020-02-13 RX ADMIN — IPRATROPIUM BROMIDE AND ALBUTEROL SULFATE 3 ML: .5; 3 SOLUTION RESPIRATORY (INHALATION) at 08:15

## 2020-02-13 RX ADMIN — LEVOTHYROXINE SODIUM 50 MCG: 0.05 TABLET ORAL at 06:09

## 2020-02-13 NOTE — PROGRESS NOTES
Warfarin dosing per pharmacist    Yari Carranza is a 79 y.o. male. Height: 5' 6\" (167.6 cm)    Weight: 80.3 kg (177 lb)    Indication:  Afib    Goal INR:  2 to 3    Home dose:  5 mg daily    Risk factors or significant drug interactions:  Amiodarone (already on both prior to admission)    Other anticoagulants:  N/A    Daily Monitoring  Date  INR     Warfarin dose HGB              Notes  2/11  2.5  5 mg?  13.9  2/12  2.6  5 mg  13.0  2/13  2.9  5 mg  ---      Pharmacy consulted to assist dosing warfarin in patient with a history of AFib who presented with a therapeutic INR of 2.5. Most recent [address] [facility] note in our system is from October 2017 at which time it was noted that patient would be followed for warfarin management by the [place]. Unable to review chart from the [place] system. Noted that patient is to be discharged today. Agree with resuming home dose of 5 mg daily for now and patient resuming outpatient follow up for further warfarin management.     Thank you,  Shan Page, PharmD, [address]  Clinical Pharmacist  [phone]

## 2020-02-13 NOTE — PROGRESS NOTES
Oxygen Qualifier       Room air: SpO2 with O2 and liter flow   Resting SpO2  93%     Ambulating SpO2  94%        Completed by:    Liseth Saunders, RT

## 2020-02-13 NOTE — DISCHARGE SUMMARY
Hospitalist Discharge Summary     Patient ID:  Shayy Youngblood  [id number]  57 y.o.  1940  Admit date: 2/11/2020  Discharge date: Attending: Alessandro Keys MD  PCP:  Abbi Muhammad MD    Admission Diagnosis: Acute exacerbation of chronic obstructive pulmonary disease (COPD) [facility])    Discharge Diagnosis: Acute exacerbation of chronic obstructive pulmonary disease (COPD) ([facility]   Principal Problem:    Acute exacerbation of chronic obstructive pulmonary disease (COPD) ([facility] (2/11/2020)         Hospital Course:  Please refer to the admission H&P for details of presentation. In summary, This is a 56-year-old gentleman with history of hypertension, hypothyroidism, history of prostate cancer, A. fib on anticoagulation with Coumadin, CKD stage III, possible underlying COPD, admitted to the hospital for COPD exacerbation and hypoxemia. He was placed on prednisone, bronchodilators and his respiratory status improved. Currently he is on room air at rest.  Will do home oxygen evaluation with ambulation. If his oxygen saturation drops below 89%, home oxygen will be arranged by case management before discharge. He was started on bronchodilators at the time of discharge. Patient probably had acute kidney injury on top of his baseline CKD stage III, so he was given IV fluids. Creatinine continues to improve. As patient is hemodynamically stable, breathing clinically stable, as he is symptomatically much improved, he is being discharged home today after home oxygen evaluation. Patient likely has undiagnosed COPD and needs formal pulmonary function testing as outpatient. For this he needs follow-up with pulmonologist in 2 to 3 weeks. Patient also needs follow-up with his primary care physician in 1 week.      Lab/Data Reviewed    Discharge Exam:  Visit Vitals  /74 (BP 1 Location: Left arm, BP Patient Position: At rest)   Pulse 89   Temp 98.5 °F (36.9 °C)   Resp 18   Ht 5' 6\" (1.676 m)   Wt 80.3 kg (177 lb)   SpO2 94%   BMI 28.57 kg/m²     General: Conscious, comfortable  Lungs:  CTA Bilaterally with occasional expiratory wheezes. Heart:  S1 S2 regular  Abdomen: Soft, Positive bowel sounds, NTND, No guarding/rigidity/rebound tend. Neurologic:  AAOX3, No gross FND  Psych:             Appropriate mood    Disposition: home  Discharge Condition: Stable  Patient Instructions:   Current Discharge Medication List      START taking these medications    Details   guaiFENesin ER (MUCINEX) 600 mg ER tablet Take 1 Tab by mouth two (2) times daily as needed for Congestion for up to 5 days. Qty: 10 Tab, Refills: 0      albuterol-ipratropium (DUO-NEB) 2.5 mg-0.5 mg/3 ml nebu 3 mL by Nebulization route every six (6) hours as needed for Shortness of Breath. Use the nebulizer if albuterol inhaler is not helping enough. Qty: 60 Nebule, Refills: 0      methylPREDNISolone (MEDROL DOSEPACK) 4 mg tablet As per instructions on the pack  Qty: 1 Dose Pack, Refills: 0      Nebulizer Accessories kit Diagnosis: COPD  Qty: 1 Kit, Refills: 0      polyethylene glycol (MIRALAX) 17 gram packet Take 1 Packet by mouth daily as needed for Constipation for up to 10 days. Qty: 10 Packet, Refills: 1      tiotropium (SPIRIVA) 18 mcg inhalation capsule Take 1 Cap by inhalation daily for 30 days. Qty: 30 Cap, Refills: 0      umeclidinium-vilanterol (ANORO ELLIPTA) 62.5-25 mcg/actuation inhaler Take 1 Puff by inhalation daily. Diagnosis: COPD  May substitute if needed  Qty: 1 Inhaler, Refills: 1      albuterol (PROVENTIL HFA, VENTOLIN HFA, PROAIR HFA) 90 mcg/actuation inhaler Take 2 Puffs by inhalation every four (4) hours as needed for Wheezing or Shortness of Breath. Qty: 1 Inhaler, Refills: 1         CONTINUE these medications which have CHANGED    Details   amiodarone (CORDARONE) 200 mg tablet Take 1 Tab by mouth daily for 30 days. Qty: 30 Tab, Refills: 0      atorvastatin (LIPITOR) 20 mg tablet Take 1 Tab by mouth nightly for 30 days.   Qty: 30 Tab, Refills: 0         CONTINUE these medications which have NOT CHANGED    Details   metoprolol tartrate (LOPRESSOR) 25 mg tablet Take  by mouth two (2) times a day. warfarin (COUMADIN) 5 mg tablet Take 1 Tab by mouth daily. Qty: 90 Tab, Refills: 3      levothyroxine (SYNTHROID) 50 mcg tablet Take  by mouth Daily (before breakfast). terazosin (HYTRIN) 10 mg capsule Take 10 mg by mouth nightly. amLODIPine (NORVASC) 5 mg tablet Take 5 mg by mouth daily. finasteride (PROSCAR) 5 mg tablet Take 1 Tab by mouth daily. Qty: 30 Tab, Refills: 6             Follow-up  Pt was advised to follow up with PCP in one week. Pulmonologist in 2 weeks. Total time spent in discharge day management: 25 minutes. Signed:  Melly Stephens MD  Change to discharge meds. Patient is placed on Anoro Ellipta only. no Spiriva.

## 2020-02-13 NOTE — PROGRESS NOTES
All hourly rounds/assessments completed per this shift. All needs met at this time. Bed locked/low. Personal belongings within reach. Call light within reach. Bedside shift report will be given to the oncoming nurse.

## 2020-02-13 NOTE — PROGRESS NOTES
Care Management Interventions  PCP Verified by CM: Yes(Dr. Taylor Killian)  Mode of Transport at Discharge: Self  Transition of Care Consult (CM Consult): Discharge Planning  Discharge Durable Medical Equipment: Yes(Walker)  Physical Therapy Consult: Yes  Occupational Therapy Consult: No  Speech Therapy Consult: No  Current Support Network: Own Home, Lives Alone  Confirm Follow Up Transport: Self  The Plan for Transition of Care is Related to the Following Treatment Goals : strength and gait training  The Patient and/or Patient Representative was Provided with a Choice of Provider and Agrees with the Discharge Plan?: Yes  Name of the Patient Representative Who was Provided with a Choice of Provider and Agrees with the Discharge Plan: self  Freedom of Choice List was Provided with Basic Dialogue that Supports the Patient's Individualized Plan of Care/Goals, Treatment Preferences and Shares the Quality Data Associated with the Providers?: Yes  Discharge Location  Discharge Placement: Home with home health    Patient to discharge home. CM provided patient with a Nebulizer from [employer]. Patient signed contract for Nebulizer and was provided with a copy. CM went over medications and provided teaching with patient as he was confused as to what they were for and how to use them. Patient will be transported home by his brother. All milestones have been met for discharge.

## 2020-02-13 NOTE — PROGRESS NOTES
Hourly rounds completed this shift. Patient rested quietly throughout the night. All needs met at this time. Will continue to monitor & give report to oncoming RN.

## 2020-02-13 NOTE — DISCHARGE INSTRUCTIONS
DISCHARGE SUMMARY from Nurse    PATIENT INSTRUCTIONS:    After general anesthesia or intravenous sedation, for 24 hours or while taking prescription Narcotics:  · Limit your activities  · Do not drive and operate hazardous machinery  · Do not make important personal or business decisions  · Do  not drink alcoholic beverages  · If you have not urinated within 8 hours after discharge, please contact your surgeon on call. Report the following to your surgeon:  · Excessive pain, swelling, redness or odor of or around the surgical area  · Temperature over 100.5  · Nausea and vomiting lasting longer than 4 hours or if unable to take medications  · Any signs of decreased circulation or nerve impairment to extremity: change in color, persistent  numbness, tingling, coldness or increase pain  · Any questions    What to do at Home:  Recommended activity: Activity as tolerated    If you experience any of the following symptoms Increased shortness of breath, fever greater than 101, changes in mental status, or excessive sputum production, please follow up with your primary care provider. *  Please give a list of your current medications to your Primary Care Provider. *  Please update this list whenever your medications are discontinued, doses are      changed, or new medications (including over-the-counter products) are added. *  Please carry medication information at all times in case of emergency situations. These are general instructions for a healthy lifestyle:    No smoking/ No tobacco products/ Avoid exposure to second hand smoke  Surgeon General's Warning:  Quitting smoking now greatly reduces serious risk to your health.     Obesity, smoking, and sedentary lifestyle greatly increases your risk for illness    A healthy diet, regular physical exercise & weight monitoring are important for maintaining a healthy lifestyle    You may be retaining fluid if you have a history of heart failure or if you experience any of the following symptoms:  Weight gain of 3 pounds or more overnight or 5 pounds in a week, increased swelling in our hands or feet or shortness of breath while lying flat in bed. Please call your doctor as soon as you notice any of these symptoms; do not wait until your next office visit. The discharge information has been reviewed with the patient. The patient verbalized understanding. Discharge medications reviewed with the patient and appropriate educational materials and side effects teaching were provided.   ___________________________________________________________________________________________________________________________________

## 2020-02-13 NOTE — PROGRESS NOTES
Discharge instructions and prescriptions given and reviewed with pt, verbalizes understanding, ambulating RA sat is 94%, pt states he does not have a ride home, will alert CM.

## 2020-02-19 NOTE — CDMP QUERY
Progress notes mention, \"Paroxysmal A. fib: Keep him on amiodarone and 
metoprolol, warfarin for anticoagulation. \" After study, can the suspected type 
of AFib be further specified as: 
 
·Chronic AFib ·Persistent AFib ·Paroxysmal AFib only ·Other_______ ·Unable to determine Thanks, [facility] Team 
[phone]

## 2021-10-14 ENCOUNTER — APPOINTMENT (OUTPATIENT)
Dept: GENERAL RADIOLOGY | Age: 81
End: 2021-10-14
Attending: EMERGENCY MEDICINE
Payer: MEDICARE

## 2021-10-14 ENCOUNTER — HOSPITAL ENCOUNTER (EMERGENCY)
Age: 81
Discharge: HOME | End: 2021-10-14
Attending: EMERGENCY MEDICINE
Payer: MEDICARE

## 2021-10-14 ENCOUNTER — APPOINTMENT (OUTPATIENT)
Dept: CT IMAGING | Age: 81
End: 2021-10-14
Attending: EMERGENCY MEDICINE
Payer: MEDICARE

## 2021-10-14 VITALS
WEIGHT: 175 LBS | SYSTOLIC BLOOD PRESSURE: 133 MMHG | BODY MASS INDEX: 28.12 KG/M2 | TEMPERATURE: 98.8 F | OXYGEN SATURATION: 95 % | HEART RATE: 81 BPM | RESPIRATION RATE: 17 BRPM | HEIGHT: 66 IN | DIASTOLIC BLOOD PRESSURE: 61 MMHG

## 2021-10-14 DIAGNOSIS — M25.512 ACUTE PAIN OF LEFT SHOULDER: ICD-10-CM

## 2021-10-14 DIAGNOSIS — S00.03XA CONTUSION OF SCALP, INITIAL ENCOUNTER: Primary | ICD-10-CM

## 2021-10-14 LAB
ALBUMIN SERPL-MCNC: 3.1 G/DL (ref 3.2–4.6)
ALBUMIN/GLOB SERPL: 0.9 {RATIO} (ref 1.2–3.5)
ALP SERPL-CCNC: 87 U/L (ref 50–136)
ALT SERPL-CCNC: 120 U/L (ref 12–65)
ANION GAP SERPL CALC-SCNC: 8 MMOL/L (ref 7–16)
AST SERPL-CCNC: 135 U/L (ref 15–37)
BASOPHILS # BLD: 0 K/UL (ref 0–0.2)
BASOPHILS NFR BLD: 0 % (ref 0–2)
BILIRUB SERPL-MCNC: 1.8 MG/DL (ref 0.2–1.1)
BUN SERPL-MCNC: 44 MG/DL (ref 8–23)
CALCIUM SERPL-MCNC: 8.7 MG/DL (ref 8.3–10.4)
CHLORIDE SERPL-SCNC: 110 MMOL/L (ref 98–107)
CO2 SERPL-SCNC: 26 MMOL/L (ref 21–32)
CREAT SERPL-MCNC: 3.72 MG/DL (ref 0.8–1.5)
DIFFERENTIAL METHOD BLD: ABNORMAL
EOSINOPHIL # BLD: 0 K/UL (ref 0–0.8)
EOSINOPHIL NFR BLD: 0 % (ref 0.5–7.8)
ERYTHROCYTE [DISTWIDTH] IN BLOOD BY AUTOMATED COUNT: 15.6 % (ref 11.9–14.6)
GLOBULIN SER CALC-MCNC: 3.4 G/DL (ref 2.3–3.5)
GLUCOSE SERPL-MCNC: 128 MG/DL (ref 65–100)
HCT VFR BLD AUTO: 34.6 % (ref 41.1–50.3)
HGB BLD-MCNC: 11.3 G/DL (ref 13.6–17.2)
IMM GRANULOCYTES # BLD AUTO: 0.1 K/UL (ref 0–0.5)
IMM GRANULOCYTES NFR BLD AUTO: 1 % (ref 0–5)
INR PPP: 2.9
LYMPHOCYTES # BLD: 0.6 K/UL (ref 0.5–4.6)
LYMPHOCYTES NFR BLD: 9 % (ref 13–44)
MCH RBC QN AUTO: 29.4 PG (ref 26.1–32.9)
MCHC RBC AUTO-ENTMCNC: 32.7 G/DL (ref 31.4–35)
MCV RBC AUTO: 90.1 FL (ref 79.6–97.8)
MONOCYTES # BLD: 1.2 K/UL (ref 0.1–1.3)
MONOCYTES NFR BLD: 17 % (ref 4–12)
NEUTS SEG # BLD: 5.1 K/UL (ref 1.7–8.2)
NEUTS SEG NFR BLD: 73 % (ref 43–78)
NRBC # BLD: 0 K/UL (ref 0–0.2)
PLATELET # BLD AUTO: 146 K/UL (ref 150–450)
PLATELET COMMENTS,PCOM: ADEQUATE
PMV BLD AUTO: 10.2 FL (ref 9.4–12.3)
POTASSIUM SERPL-SCNC: 3.5 MMOL/L (ref 3.5–5.1)
PROT SERPL-MCNC: 6.5 G/DL (ref 6.3–8.2)
PROTHROMBIN TIME: 30.3 SEC (ref 12.6–14.5)
RBC # BLD AUTO: 3.84 M/UL (ref 4.23–5.6)
RBC MORPH BLD: ABNORMAL
SODIUM SERPL-SCNC: 144 MMOL/L (ref 138–145)
WBC # BLD AUTO: 7 K/UL (ref 4.3–11.1)
WBC MORPH BLD: ABNORMAL

## 2021-10-14 PROCEDURE — 99285 EMERGENCY DEPT VISIT HI MDM: CPT

## 2021-10-14 PROCEDURE — 93005 ELECTROCARDIOGRAM TRACING: CPT | Performed by: EMERGENCY MEDICINE

## 2021-10-14 PROCEDURE — 85025 COMPLETE CBC W/AUTO DIFF WBC: CPT

## 2021-10-14 PROCEDURE — 85610 PROTHROMBIN TIME: CPT

## 2021-10-14 PROCEDURE — 80053 COMPREHEN METABOLIC PANEL: CPT

## 2021-10-14 PROCEDURE — 71045 X-RAY EXAM CHEST 1 VIEW: CPT

## 2021-10-14 PROCEDURE — 74011636637 HC RX REV CODE- 636/637: Performed by: EMERGENCY MEDICINE

## 2021-10-14 PROCEDURE — 74011250636 HC RX REV CODE- 250/636: Performed by: EMERGENCY MEDICINE

## 2021-10-14 PROCEDURE — 96374 THER/PROPH/DIAG INJ IV PUSH: CPT

## 2021-10-14 PROCEDURE — 94762 N-INVAS EAR/PLS OXIMTRY CONT: CPT

## 2021-10-14 PROCEDURE — A9270 NON-COVERED ITEM OR SERVICE: HCPCS | Performed by: EMERGENCY MEDICINE

## 2021-10-14 PROCEDURE — 70450 CT HEAD/BRAIN W/O DYE: CPT

## 2021-10-14 PROCEDURE — 73030 X-RAY EXAM OF SHOULDER: CPT

## 2021-10-14 RX ORDER — SODIUM CHLORIDE 0.9 % (FLUSH) 0.9 %
5-10 SYRINGE (ML) INJECTION EVERY 8 HOURS
Status: DISCONTINUED | OUTPATIENT
Start: 2021-10-14 | End: 2021-10-15 | Stop reason: HOSPADM

## 2021-10-14 RX ORDER — SODIUM CHLORIDE 0.9 % (FLUSH) 0.9 %
5-10 SYRINGE (ML) INJECTION AS NEEDED
Status: DISCONTINUED | OUTPATIENT
Start: 2021-10-14 | End: 2021-10-15 | Stop reason: HOSPADM

## 2021-10-14 RX ORDER — TRAMADOL HYDROCHLORIDE 50 MG/1
50-100 TABLET ORAL
Qty: 20 TABLET | Refills: 0 | Status: SHIPPED | OUTPATIENT
Start: 2021-10-14 | End: 2021-10-19

## 2021-10-14 RX ORDER — MORPHINE SULFATE 2 MG/ML
2 INJECTION, SOLUTION INTRAMUSCULAR; INTRAVENOUS
Status: COMPLETED | OUTPATIENT
Start: 2021-10-14 | End: 2021-10-14

## 2021-10-14 RX ORDER — PREDNISONE 20 MG/1
40 TABLET ORAL DAILY
Qty: 10 TABLET | Refills: 0 | Status: SHIPPED | OUTPATIENT
Start: 2021-10-14 | End: 2021-10-19

## 2021-10-14 RX ADMIN — PREDNISONE 60 MG: 10 TABLET ORAL at 20:43

## 2021-10-14 RX ADMIN — MORPHINE SULFATE 2 MG: 2 INJECTION, SOLUTION INTRAMUSCULAR; INTRAVENOUS at 18:57

## 2021-10-14 NOTE — ED TRIAGE NOTES
Pt states yesterday and again today that when he bends over he falls. Now has rib pain and can not lift his left arm.

## 2021-10-15 LAB
ATRIAL RATE: 89 BPM
CALCULATED P AXIS, ECG09: 104 DEGREES
CALCULATED R AXIS, ECG10: 53 DEGREES
CALCULATED T AXIS, ECG11: 73 DEGREES
DIAGNOSIS, 93000: NORMAL
P-R INTERVAL, ECG05: 220 MS
Q-T INTERVAL, ECG07: 382 MS
QRS DURATION, ECG06: 98 MS
QTC CALCULATION (BEZET), ECG08: 448 MS
VENTRICULAR RATE, ECG03: 83 BPM

## 2021-10-15 NOTE — DISCHARGE INSTRUCTIONS
Be careful when squatting down and leaning forwards  You may have torn the rotator cuff on the left shoulder and have some very bad arthritis there  Ultram as needed for pain, beware of dizziness  Take the prednisone 2 pills nightly starting tomorrow night  Follow-up with [facility] for the shoulder pain    Wear sling for comfort for a few days.   Remove sling to do some range of motion exercises several times a day, as demonstrated    Use cold packs 5 to 10 minutes, every hour to every-other-hour, for first 48 hours,  Then switch to a heating pad, 5 to 10 minutes, every hour to every-other-hour, for a few days,  Do not go to heat, right away

## 2021-10-15 NOTE — ED NOTES
I have reviewed discharge instructions with the patient. The patient verbalized understanding. Patient left ED via Discharge Method: wheelchair to Home with brother. Opportunity for questions and clarification provided. Patient given 2 scripts. To continue your aftercare when you leave the hospital, you may receive an automated call from our care team to check in on how you are doing. This is a free service and part of our promise to provide the best care and service to meet your aftercare needs.  If you have questions, or wish to unsubscribe from this service please call [phone]. Thank you for Choosing our [facility] Emergency Department.

## 2021-10-15 NOTE — ED PROVIDER NOTES
44-year-old gentleman presents to the ER with 2 falls in 2 consecutive days, both were associated with squatting down to pick something up and as he bent over he lost his balance and fell forwards. He has abrasions on top of his scalp, and acute shoulder pain after this afternoon's fall. He is unable to lift the shoulder on his own. And has some mild left anterolateral rib pain. There was no loss of consciousness, but patient is on Coumadin for atrial fibrillation. Past Medical History:   Diagnosis Date    CAD (coronary artery disease)     high cholesterol    Cancer (HCC)     prostate    Endocrine disease     thyroid       Past Surgical History:   Procedure Laterality Date    HX PROSTATECTOMY           No family history on file. Social History     Socioeconomic History    Marital status:      Spouse name: Not on file    Number of children: Not on file    Years of education: Not on file    Highest education level: Not on file   Occupational History    Not on file   Tobacco Use    Smoking status: Former Smoker    Smokeless tobacco: Never Used   Substance and Sexual Activity    Alcohol use: No    Drug use: Not on file    Sexual activity: Not on file   Other Topics Concern    Not on file   Social History Narrative    Not on file     Social Determinants of Health     Financial Resource Strain:     Difficulty of Paying Living Expenses:    Food Insecurity:     Worried About [facility] in the Last Year:     [facility] in the Last Year:    Transportation Needs:     Lack of Transportation (Medical):      Lack of Transportation (Non-Medical):    Physical Activity:     Days of Exercise per Week:     Minutes of Exercise per Session:    Stress:     Feeling of Stress :    Social Connections:     Frequency of Communication with Friends and Family:     Frequency of Social Gatherings with Friends and Family:     Attends Synagogue Services:     Active Member of [address] [place] [address] or Organizations:     Attends Club or Organization Meetings:     Marital Status:    Intimate Partner Violence:     Fear of Current or Ex-Partner:     Emotionally Abused:     Physically Abused:     Sexually Abused: ALLERGIES: Patient has no known allergies. Review of Systems   HENT: Negative for dental problem and nosebleeds. Eyes: Negative for pain and redness. Respiratory: Negative for shortness of breath and stridor. Cardiovascular: Negative for chest pain (Rib pain). Gastrointestinal: Negative for abdominal pain, nausea and vomiting. Genitourinary: Negative for difficulty urinating and hematuria. Musculoskeletal: Positive for arthralgias. Negative for back pain, gait problem, joint swelling, myalgias, neck pain and neck stiffness. Skin: Positive for wound (Scalp abrasions). Negative for pallor. Neurological: Negative for dizziness, syncope, weakness, numbness and headaches. All other systems reviewed and are negative. Vitals:    10/14/21 1832 10/14/21 1833 10/14/21 1834 10/14/21 2042   BP: (!) 131/57 (!) 113/55 133/61    Pulse:   81    Resp:       Temp:       SpO2: 98% 96% 95%    Weight:    79.4 kg (175 lb)   Height:    5' 6\" (1.676 m)            Physical Exam  Vitals and nursing note reviewed. Constitutional:       General: He is not in acute distress. Appearance: Normal appearance. He is well-developed. He is not ill-appearing, toxic-appearing or diaphoretic. HENT:      Head: Normocephalic. Comments: Minor abrasions about scalp     Right Ear: External ear normal.      Left Ear: External ear normal.   Eyes:      General:         Right eye: No discharge. Left eye: No discharge. Conjunctiva/sclera: Conjunctivae normal.   Cardiovascular:      Rate and Rhythm: Normal rate. Rhythm irregular. Pulmonary:      Effort: Pulmonary effort is normal. No respiratory distress. Chest:      Chest wall: Tenderness present. No deformity or crepitus. Musculoskeletal:         General: Tenderness present. Left shoulder: Tenderness, bony tenderness and crepitus present. No deformity. Decreased range of motion. Cervical back: Normal range of motion and neck supple. Skin:     General: Skin is warm and dry. Findings: No rash. Neurological:      General: No focal deficit present. Mental Status: He is alert and oriented to person, place, and time. Mental status is at baseline. Motor: No abnormal muscle tone. Comments: cni 2-12 grossly  Nl gait,  Nl speech     Psychiatric:         Mood and Affect: Mood normal.         Behavior: Behavior normal.          MDM  Number of Diagnoses or Management Options  Acute pain of left shoulder: new and requires workup  Contusion of scalp, initial encounter: new and does not require workup  Diagnosis management comments: Medical decision making note:  2 falls in 2 days on Coumadin no intracranial pathology. Shoulder looks rough, possible rotator cuff tear on top of severe degenerative joint disease, start prednisone mild analgesics, and refer to orthopedics  This concludes the \"medical decision making note\" part of this emergency department visit note.          Amount and/or Complexity of Data Reviewed  Clinical lab tests: reviewed and ordered (Results Include:    Recent Results (from the past 24 hour(s))  -EKG, 12 LEAD, INITIAL  Collection Time: 10/14/21  5:41 PM       Result                      Value             Ref Range           Ventricular Rate            83                BPM                 Atrial Rate                 89                BPM                 P-R Interval                220               ms                  QRS Duration                98                ms                  Q-T Interval                382               ms                  QTC Calculation (Bezet)     448               ms                  Calculated P Axis           104               degrees             Calculated R Axis           53                degrees             Calculated T Axis           73                degrees             Diagnosis                                                     !! AGE AND GENDER SPECIFIC ECG ANALYSIS !! Sinus rhythm with 1st degree A-V block with Fusion complexes Septal infarct (cited on or before 05-JAN-2016) Abnormal ECG When compared with ECG of 11-FEB-2020 14:27, Previous ECG has undetermined rhythm, needs review Nonspecific T wave abnormality no longer evident in Inferior leads T wave inversion now evident in Lateral leads   -CBC WITH AUTOMATED DIFF  Collection Time: 10/14/21  5:46 PM       Result                      Value             Ref Range           WBC                         7.0               4.3 - 11.1 K*       RBC                         3.84 (L)          4.23 - 5.6 M*       HGB                         11.3 (L)          13.6 - 17.2 *       HCT                         34.6 (L)          41.1 - 50.3 %       MCV                         90.1              79.6 - 97.8 *       MCH                         29.4              26.1 - 32.9 *       MCHC                        32.7              31.4 - 35.0 *       RDW                         15.6 (H)          11.9 - 14.6 %       PLATELET                    146 (L)           150 - 450 K/*       MPV                         10.2              9.4 - 12.3 FL       ABSOLUTE NRBC               0.00              0.0 - 0.2 K/*       NEUTROPHILS                 73                43 - 78 %           LYMPHOCYTES                 9 (L)             13 - 44 %           MONOCYTES                   17 (H)            4.0 - 12.0 %        EOSINOPHILS                 0 (L)             0.5 - 7.8 %         BASOPHILS                   0                 0.0 - 2.0 %         IMMATURE GRANULOCYTES       1                 0.0 - 5.0 %         ABS. NEUTROPHILS            5.1               1.7 - 8.2 K/*       ABS.  LYMPHOCYTES            0.6               0.5 - 4.6 K/* ABS. MONOCYTES              1.2               0.1 - 1.3 K/*       ABS. EOSINOPHILS            0.0               0.0 - 0.8 K/*       ABS. BASOPHILS              0.0               0.0 - 0.2 K/*       ABS. IMM. GRANS.            0.1               0.0 - 0.5 K/*       RBC COMMENTS                                                  NORMOCYTIC/NORMOCHROMIC       WBC COMMENTS                                                  Result Confirmed By Smear       PLATELET COMMENTS           ADEQUATE                              DF                          AUTOMATED                        -METABOLIC PANEL, COMPREHENSIVE  Collection Time: 10/14/21  5:46 PM       Result                      Value             Ref Range           Sodium                      144               138 - 145 mm*       Potassium                   3.5               3.5 - 5.1 mm*       Chloride                    110 (H)           98 - 107 mmo*       CO2                         26                21 - 32 mmol*       Anion gap                   8                 7 - 16 mmol/L       Glucose                     128 (H)           65 - 100 mg/*       BUN                         44 (H)            8 - 23 MG/DL        Creatinine                  3.72 (H)          0.8 - 1.5 MG*       GFR est AA                  20 (L)            >60 ml/min/1*       GFR est non-AA              17 (L)            >60 ml/min/1*       Calcium                     8.7               8.3 - 10.4 M*       Bilirubin, total            1.8 (H)           0.2 - 1.1 MG*       ALT (SGPT)                  120 (H)           12 - 65 U/L         AST (SGOT)                  135 (H)           15 - 37 U/L         Alk.  phosphatase            87                50 - 136 U/L        Protein, total              6.5               6.3 - 8.2 g/*       Albumin                     3.1 (L)           3.2 - 4.6 g/*       Globulin                    3.4               2.3 - 3.5 g/*       A-G Ratio                   0.9 (L) 1.2 - 3.5      -PROTHROMBIN TIME + INR  Collection Time: 10/14/21  5:46 PM       Result                      Value             Ref Range           Prothrombin time            30.3 (H)          12.6 - 14.5 *       INR                         2.9                              )  Tests in the radiology section of CPT®: reviewed and ordered (XR SHOULDER LT AP/LAT MIN 2 V   Final Result    1. 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 2. Probable rotator cuff tear. CT HEAD WO CONT   Final Result    1. No acute intracranial abnormality. 2. Moderate atrophy. 3. Mild bilateral chronic white matter microangiopathic changes.     XR CHEST PORT   Final Result    Normal chest.     )  Decide to obtain previous medical records or to obtain history from someone other than the patient: yes    Risk of Complications, Morbidity, and/or Mortality  Presenting problems: moderate  Diagnostic procedures: low  Management options: low    Patient Progress  Patient progress: improved         Procedures

## 2021-11-16 ENCOUNTER — HOSPITAL ENCOUNTER (EMERGENCY)
Age: 81
Discharge: HOME | End: 2021-11-16
Attending: EMERGENCY MEDICINE
Payer: MEDICARE

## 2021-11-16 ENCOUNTER — APPOINTMENT (OUTPATIENT)
Dept: GENERAL RADIOLOGY | Age: 81
End: 2021-11-16
Attending: EMERGENCY MEDICINE
Payer: MEDICARE

## 2021-11-16 VITALS
RESPIRATION RATE: 20 BRPM | DIASTOLIC BLOOD PRESSURE: 55 MMHG | BODY MASS INDEX: 23.14 KG/M2 | TEMPERATURE: 97.7 F | OXYGEN SATURATION: 97 % | HEIGHT: 66 IN | WEIGHT: 144 LBS | SYSTOLIC BLOOD PRESSURE: 75 MMHG | HEART RATE: 68 BPM

## 2021-11-16 DIAGNOSIS — I95.1 ORTHOSTATIC HYPOTENSION: Primary | ICD-10-CM

## 2021-11-16 LAB
ALBUMIN SERPL-MCNC: 2.3 G/DL (ref 3.2–4.6)
ALBUMIN/GLOB SERPL: 0.6 {RATIO} (ref 1.2–3.5)
ALP SERPL-CCNC: 131 U/L (ref 50–136)
ALT SERPL-CCNC: 57 U/L (ref 12–65)
ANION GAP SERPL CALC-SCNC: 5 MMOL/L (ref 7–16)
AST SERPL-CCNC: 57 U/L (ref 15–37)
ATRIAL RATE: 267 BPM
BASOPHILS # BLD: 0.1 K/UL (ref 0–0.2)
BASOPHILS NFR BLD: 1 % (ref 0–2)
BILIRUB SERPL-MCNC: 0.5 MG/DL (ref 0.2–1.1)
BUN SERPL-MCNC: 54 MG/DL (ref 8–23)
CALCIUM SERPL-MCNC: 8.4 MG/DL (ref 8.3–10.4)
CALCULATED R AXIS, ECG10: 46 DEGREES
CALCULATED T AXIS, ECG11: 46 DEGREES
CHLORIDE SERPL-SCNC: 110 MMOL/L (ref 98–107)
CO2 SERPL-SCNC: 27 MMOL/L (ref 21–32)
CREAT SERPL-MCNC: 3.24 MG/DL (ref 0.8–1.5)
DIAGNOSIS, 93000: NORMAL
DIFFERENTIAL METHOD BLD: ABNORMAL
EOSINOPHIL # BLD: 0.1 K/UL (ref 0–0.8)
EOSINOPHIL NFR BLD: 2 % (ref 0.5–7.8)
ERYTHROCYTE [DISTWIDTH] IN BLOOD BY AUTOMATED COUNT: 15.6 % (ref 11.9–14.6)
GLOBULIN SER CALC-MCNC: 4 G/DL (ref 2.3–3.5)
GLUCOSE SERPL-MCNC: 128 MG/DL (ref 65–100)
HCT VFR BLD AUTO: 33.5 % (ref 41.1–50.3)
HGB BLD-MCNC: 10.2 G/DL (ref 13.6–17.2)
IMM GRANULOCYTES # BLD AUTO: 0.1 K/UL (ref 0–0.5)
IMM GRANULOCYTES NFR BLD AUTO: 1 % (ref 0–5)
INR PPP: 3.3
LACTATE SERPL-SCNC: 1.3 MMOL/L (ref 0.4–2)
LYMPHOCYTES # BLD: 0.8 K/UL (ref 0.5–4.6)
LYMPHOCYTES NFR BLD: 10 % (ref 13–44)
MAGNESIUM SERPL-MCNC: 2.8 MG/DL (ref 1.8–2.4)
MCH RBC QN AUTO: 27.9 PG (ref 26.1–32.9)
MCHC RBC AUTO-ENTMCNC: 30.4 G/DL (ref 31.4–35)
MCV RBC AUTO: 91.8 FL (ref 79.6–97.8)
MONOCYTES # BLD: 0.6 K/UL (ref 0.1–1.3)
MONOCYTES NFR BLD: 7 % (ref 4–12)
NEUTS SEG # BLD: 6 K/UL (ref 1.7–8.2)
NEUTS SEG NFR BLD: 79 % (ref 43–78)
NRBC # BLD: 0 K/UL (ref 0–0.2)
PLATELET # BLD AUTO: 315 K/UL (ref 150–450)
PMV BLD AUTO: 9.9 FL (ref 9.4–12.3)
POTASSIUM SERPL-SCNC: 4.3 MMOL/L (ref 3.5–5.1)
PROT SERPL-MCNC: 6.3 G/DL (ref 6.3–8.2)
PROTHROMBIN TIME: 33.5 SEC (ref 12.6–14.5)
Q-T INTERVAL, ECG07: 444 MS
QRS DURATION, ECG06: 100 MS
QTC CALCULATION (BEZET), ECG08: 461 MS
RBC # BLD AUTO: 3.65 M/UL (ref 4.23–5.6)
SODIUM SERPL-SCNC: 142 MMOL/L (ref 138–145)
VENTRICULAR RATE, ECG03: 65 BPM
WBC # BLD AUTO: 7.6 K/UL (ref 4.3–11.1)

## 2021-11-16 PROCEDURE — 83735 ASSAY OF MAGNESIUM: CPT

## 2021-11-16 PROCEDURE — 85610 PROTHROMBIN TIME: CPT

## 2021-11-16 PROCEDURE — 71046 X-RAY EXAM CHEST 2 VIEWS: CPT

## 2021-11-16 PROCEDURE — 80053 COMPREHEN METABOLIC PANEL: CPT

## 2021-11-16 PROCEDURE — 74011250636 HC RX REV CODE- 250/636: Performed by: EMERGENCY MEDICINE

## 2021-11-16 PROCEDURE — 85025 COMPLETE CBC W/AUTO DIFF WBC: CPT

## 2021-11-16 PROCEDURE — 96360 HYDRATION IV INFUSION INIT: CPT

## 2021-11-16 PROCEDURE — 99283 EMERGENCY DEPT VISIT LOW MDM: CPT

## 2021-11-16 PROCEDURE — 93005 ELECTROCARDIOGRAM TRACING: CPT | Performed by: EMERGENCY MEDICINE

## 2021-11-16 PROCEDURE — 83605 ASSAY OF LACTIC ACID: CPT

## 2021-11-16 RX ORDER — METOPROLOL TARTRATE 25 MG/1
25 TABLET, FILM COATED ORAL 2 TIMES DAILY
Qty: 60 TABLET | Refills: 0 | Status: SHIPPED | OUTPATIENT
Start: 2021-11-16 | End: 2021-12-16

## 2021-11-16 RX ADMIN — SODIUM CHLORIDE 1000 ML: 900 INJECTION, SOLUTION INTRAVENOUS at 16:16

## 2021-11-16 NOTE — ED PROVIDER NOTES
Presents the ER with complaints of low blood pressure. Apparently he was seen at the [facility] today and found to have a blood pressure in the 70s. Was instructed to come to the ER to be evaluated. Patient has no complaints today. States he has had episodes when he has bent over to get his keys when he fell and hit his head. States that is mostly resolved. Denies any chest pain. Denies any vomiting. Denies any new head trauma. Denies any abdominal pain. The history is provided by the patient. Hypotension   This is a new problem. The current episode started 1 to 2 hours ago. Pertinent negatives include no confusion, no somnolence, no unresponsiveness, no weakness, no delusions, no hallucinations, no self-injury, no tingling and no numbness. His past medical history is significant for hypertension and heart disease. His past medical history does not include diabetes, seizures, CVA, dementia or head trauma. Past Medical History:   Diagnosis Date    CAD (coronary artery disease)     high cholesterol    Cancer (HCC)     prostate    Endocrine disease     thyroid       Past Surgical History:   Procedure Laterality Date    HX PROSTATECTOMY           No family history on file.     Social History     Socioeconomic History    Marital status:      Spouse name: Not on file    Number of children: Not on file    Years of education: Not on file    Highest education level: Not on file   Occupational History    Not on file   Tobacco Use    Smoking status: Former Smoker    Smokeless tobacco: Never Used   Substance and Sexual Activity    Alcohol use: No    Drug use: Not on file    Sexual activity: Not on file   Other Topics Concern    Not on file   Social History Narrative    Not on file     Social Determinants of Health     Financial Resource Strain:     Difficulty of Paying Living Expenses: Not on file   Food Insecurity:     Worried About [address] in the Last Year: Not on file  Ran Out of Food in the Last Year: Not on file   Transportation Needs:     Lack of Transportation (Medical): Not on file    Lack of Transportation (Non-Medical): Not on file   Physical Activity:     Days of Exercise per Week: Not on file    Minutes of Exercise per Session: Not on file   Stress:     Feeling of Stress : Not on file   Social Connections:     Frequency of Communication with Friends and Family: Not on file    Frequency of Social Gatherings with Friends and Family: Not on file    Attends [employer] Services: Not on file    Active Member of [address] [place] [address] [employer] or Organizations: Not on file    Attends Club or Organization Meetings: Not on file    Marital Status: Not on file   Intimate Partner Violence:     Fear of Current or Ex-Partner: Not on file    Emotionally Abused: Not on file    Physically Abused: Not on file    Sexually Abused: Not on file   Housing Stability:     Unable to Pay for Housing in the Last Year: Not on file    Number of [place] in the Last Year: Not on file    Unstable Housing in the Last Year: Not on file         ALLERGIES: Patient has no known allergies. Review of Systems   Constitutional: Negative for diaphoresis, fatigue and fever. HENT: Negative for congestion and voice change. Eyes: Negative for photophobia and redness. Respiratory: Negative for chest tightness. Cardiovascular: Negative for chest pain and leg swelling. Gastrointestinal: Negative for abdominal pain, nausea and vomiting. Endocrine: Negative for polyphagia and polyuria. Genitourinary: Negative for decreased urine volume and urgency. Musculoskeletal: Negative for back pain, gait problem, neck pain and neck stiffness. Skin: Negative for color change and pallor. Neurological: Negative for tingling, weakness and numbness. Hematological: Negative for adenopathy. Psychiatric/Behavioral: Negative for confusion, hallucinations and self-injury.    All other systems reviewed and are negative. Vitals:    11/16/21 1418   BP: (!) 79/48   Pulse: 66   Resp: 20   Temp: 97.7 °F (36.5 °C)   SpO2: 98%   Weight: 65.3 kg (144 lb)   Height: 5' 6\" (1.676 m)            Physical Exam  Vitals and nursing note reviewed. Constitutional:       General: He is not in acute distress. Appearance: Normal appearance. He is not ill-appearing. HENT:      Head: Normocephalic and atraumatic. Right Ear: External ear normal.      Left Ear: External ear normal.      Nose: Nose normal. No congestion or rhinorrhea. Mouth/Throat:      Pharynx: No oropharyngeal exudate or posterior oropharyngeal erythema. Eyes:      Pupils: Pupils are equal, round, and reactive to light. Cardiovascular:      Rate and Rhythm: Normal rate and regular rhythm. Pulses: Normal pulses. Heart sounds: Normal heart sounds. Pulmonary:      Effort: Pulmonary effort is normal. No respiratory distress. Breath sounds: No stridor. No rhonchi. Abdominal:      General: Abdomen is flat. There is no distension. Palpations: Abdomen is soft. There is no mass. Tenderness: There is no abdominal tenderness. Hernia: No hernia is present. Musculoskeletal:         General: No swelling, tenderness or signs of injury. Cervical back: Normal range of motion and neck supple. No rigidity or tenderness. Skin:     Coloration: Skin is not jaundiced or pale. Findings: No bruising or erythema. Neurological:      General: No focal deficit present. Mental Status: He is alert and oriented to person, place, and time. Cranial Nerves: No cranial nerve deficit. Sensory: No sensory deficit. Motor: No weakness. Psychiatric:         Mood and Affect: Mood normal.         Behavior: Behavior normal.          MDM  Number of Diagnoses or Management Options  Orthostatic hypotension  Diagnosis management comments: Differential diagnosis patient is broad. Plan for screening labs, EKG.   Will check PT/INR as patient is on Coumadin    5:23 PM  Labs are stable here. Hemoglobin is 10.2    Creatinine today is 3.2, previous was 3.7. Patient was well orthostatic here. blood pressure improved with IV fluids    Cussed with patient his family results of testing here. He is on multiple vasoactive medications. Plan at this time is to decrease patient's dose of metoprolol to 25 mg, as well as discontinue his tamsulosin         Amount and/or Complexity of Data Reviewed  Clinical lab tests: ordered and reviewed  Tests in the radiology section of CPT®: ordered and reviewed  Review and summarize past medical records: yes  Independent visualization of images, tracings, or specimens: yes (EKG interpretation: Sinus rhythm, rate 62, normal axis, no blocks, no acute ST segment changes noted)    Risk of Complications, Morbidity, and/or Mortality  Presenting problems: moderate  Diagnostic procedures: moderate  Management options: high    Patient Progress  Patient progress: stable         Procedures          Results Include:    Recent Results (from the past 24 hour(s))   CBC WITH AUTOMATED DIFF    Collection Time: 11/16/21  2:23 PM   Result Value Ref Range    WBC 7.6 4.3 - 11.1 K/uL    RBC 3.65 (L) 4.23 - 5.6 M/uL    HGB 10.2 (L) 13.6 - 17.2 g/dL    HCT 33.5 (L) 41.1 - 50.3 %    MCV 91.8 79.6 - 97.8 FL    MCH 27.9 26.1 - 32.9 PG    MCHC 30.4 (L) 31.4 - 35.0 g/dL    RDW 15.6 (H) 11.9 - 14.6 %    PLATELET 894 947 - 057 K/uL    MPV 9.9 9.4 - 12.3 FL    ABSOLUTE NRBC 0.00 0.0 - 0.2 K/uL    DF AUTOMATED      NEUTROPHILS 79 (H) 43 - 78 %    LYMPHOCYTES 10 (L) 13 - 44 %    MONOCYTES 7 4.0 - 12.0 %    EOSINOPHILS 2 0.5 - 7.8 %    BASOPHILS 1 0.0 - 2.0 %    IMMATURE GRANULOCYTES 1 0.0 - 5.0 %    ABS. NEUTROPHILS 6.0 1.7 - 8.2 K/UL    ABS. LYMPHOCYTES 0.8 0.5 - 4.6 K/UL    ABS. MONOCYTES 0.6 0.1 - 1.3 K/UL    ABS. EOSINOPHILS 0.1 0.0 - 0.8 K/UL    ABS. BASOPHILS 0.1 0.0 - 0.2 K/UL    ABS. IMM.  GRANS. 0.1 0.0 - 0.5 K/UL   METABOLIC PANEL, COMPREHENSIVE    Collection Time: 11/16/21  2:23 PM   Result Value Ref Range    Sodium 142 138 - 145 mmol/L    Potassium 4.3 3.5 - 5.1 mmol/L    Chloride 110 (H) 98 - 107 mmol/L    CO2 27 21 - 32 mmol/L    Anion gap 5 (L) 7 - 16 mmol/L    Glucose 128 (H) 65 - 100 mg/dL    BUN 54 (H) 8 - 23 MG/DL    Creatinine 3.24 (H) 0.8 - 1.5 MG/DL    GFR est AA 24 (L) >60 ml/min/1.73m2    GFR est non-AA 20 (L) >60 ml/min/1.73m2    Calcium 8.4 8.3 - 10.4 MG/DL    Bilirubin, total 0.5 0.2 - 1.1 MG/DL    ALT (SGPT) 57 12 - 65 U/L    AST (SGOT) 57 (H) 15 - 37 U/L    Alk. phosphatase 131 50 - 136 U/L    Protein, total 6.3 6.3 - 8.2 g/dL    Albumin 2.3 (L) 3.2 - 4.6 g/dL    Globulin 4.0 (H) 2.3 - 3.5 g/dL    A-G Ratio 0.6 (L) 1.2 - 3.5     MAGNESIUM    Collection Time: 11/16/21  2:23 PM   Result Value Ref Range    Magnesium 2.8 (H) 1.8 - 2.4 mg/dL   LACTIC ACID    Collection Time: 11/16/21  2:23 PM   Result Value Ref Range    Lactic acid 1.3 0.4 - 2.0 MMOL/L   PROTHROMBIN TIME + INR    Collection Time: 11/16/21  2:23 PM   Result Value Ref Range    Prothrombin time 33.5 (H) 12.6 - 14.5 sec    INR 3.3     EKG, 12 LEAD, INITIAL    Collection Time: 11/16/21  4:25 PM   Result Value Ref Range    Ventricular Rate 65 BPM    Atrial Rate 267 BPM    QRS Duration 100 ms    Q-T Interval 444 ms    QTC Calculation (Bezet) 461 ms    Calculated R Axis 46 degrees    Calculated T Axis 46 degrees    Diagnosis       !! AGE AND GENDER SPECIFIC ECG ANALYSIS !!  Junctional rhythm  Low voltage QRS  Cannot rule out Anterior infarct (cited on or before 05-JAN-2016)  Abnormal ECG  When compared with ECG of 14-OCT-2021 17:41,  Junctional rhythm has replaced Sinus rhythm  Questionable change in initial forces of Anterior leads  T wave inversion no longer evident in Lateral leads       Voice dictation software was used during the making of this note. This software is not perfect and grammatical and other typographical errors may be present.   This note has been proofread, but may still contain errors.   Vern Osman MD; 11/16/2021 @5:24 PM   ===================================================================

## 2021-11-16 NOTE — ED NOTES
I have reviewed discharge instructions with the patient. The patient verbalized understanding. Patient left ED via Discharge Method: stretcher to Home with kanwal. Opportunity for questions and clarification provided. Patient given 1 scripts. To continue your aftercare when you leave the hospital, you may receive an automated call from our care team to check in on how you are doing. This is a free service and part of our promise to provide the best care and service to meet your aftercare needs.  If you have questions, or wish to unsubscribe from this service please call [phone]. Thank you for Choosing our [address] [place] [address] Emergency Department.

## 2021-11-16 NOTE — ED TRIAGE NOTES
Patient ambulatory to triage with mask in place. Patient reports he was sent by the [place] for hypotension. His brother reports blood pressure 70/40 at home for the past week. Pt has continued to take his home BP medications. Pt reports some weakness.

## 2021-11-16 NOTE — DISCHARGE INSTRUCTIONS
We are discontinuing your Terazosin  We are cutting your dose of metoprolol to 25 mg twice daily  Drink plenty of fluids  Monitor your blood pressure at home  Follow-up with your primary care physician  Follow-up with your cardiologist  Return to the ER for any new, worsening or life-threatening symptoms

## 2022-03-18 PROBLEM — E78.5 DYSLIPIDEMIA: Status: ACTIVE | Noted: 2017-06-20

## 2022-03-19 PROBLEM — I48.0 PAROXYSMAL ATRIAL FIBRILLATION (HCC): Status: ACTIVE | Noted: 2017-10-12

## 2022-03-19 PROBLEM — I10 HTN (HYPERTENSION): Status: ACTIVE | Noted: 2017-06-20

## 2022-03-19 PROBLEM — J44.1 ACUTE EXACERBATION OF CHRONIC OBSTRUCTIVE PULMONARY DISEASE (COPD) (HCC): Status: ACTIVE | Noted: 2020-02-11

## 2022-03-19 PROBLEM — E03.9 ACQUIRED HYPOTHYROIDISM: Status: ACTIVE | Noted: 2017-06-20

## 2022-03-19 PROBLEM — I48.91 ATRIAL FIBRILLATION WITH RVR (HCC): Status: ACTIVE | Noted: 2017-06-20

## 2022-03-20 PROBLEM — C61 PROSTATE CANCER (HCC): Status: ACTIVE | Noted: 2017-06-20

## 2022-06-01 DIAGNOSIS — J44.1 ACUTE EXACERBATION OF CHRONIC OBSTRUCTIVE PULMONARY DISEASE (COPD) (HCC): Primary | ICD-10-CM

## 2022-06-06 ENCOUNTER — HOSPITAL ENCOUNTER (OUTPATIENT)
Dept: PULMONOLOGY | Age: 82
Discharge: HOME | End: 2022-06-09
Payer: OTHER GOVERNMENT

## 2022-06-06 DIAGNOSIS — J44.1 ACUTE EXACERBATION OF CHRONIC OBSTRUCTIVE PULMONARY DISEASE (COPD) (HCC): ICD-10-CM

## 2022-06-06 PROCEDURE — 94060 EVALUATION OF WHEEZING: CPT

## 2022-06-06 PROCEDURE — 94729 DIFFUSING CAPACITY: CPT

## 2022-06-06 PROCEDURE — 94726 PLETHYSMOGRAPHY LUNG VOLUMES: CPT

## 2022-06-06 PROCEDURE — 94010 BREATHING CAPACITY TEST: CPT
